# Patient Record
Sex: FEMALE | Race: WHITE | Employment: FULL TIME
[De-identification: names, ages, dates, MRNs, and addresses within clinical notes are randomized per-mention and may not be internally consistent; named-entity substitution may affect disease eponyms.]

---

## 2017-01-11 ENCOUNTER — OFFICE VISIT (OUTPATIENT)
Facility: CLINIC | Age: 47
End: 2017-01-11

## 2017-01-11 VITALS
BODY MASS INDEX: 37.89 KG/M2 | OXYGEN SATURATION: 98 % | DIASTOLIC BLOOD PRESSURE: 84 MMHG | TEMPERATURE: 97.8 F | HEIGHT: 60 IN | WEIGHT: 193 LBS | HEART RATE: 74 BPM | SYSTOLIC BLOOD PRESSURE: 134 MMHG

## 2017-01-11 DIAGNOSIS — I10 ESSENTIAL HYPERTENSION: Primary | ICD-10-CM

## 2017-01-11 PROCEDURE — 99214 OFFICE O/P EST MOD 30 MIN: CPT | Performed by: PHYSICIAN ASSISTANT

## 2017-01-11 RX ORDER — LISINOPRIL 10 MG/1
10 TABLET ORAL DAILY
Qty: 30 TABLET | Refills: 3 | Status: SHIPPED | OUTPATIENT
Start: 2017-01-11 | End: 2017-02-16 | Stop reason: SDUPTHER

## 2017-01-11 ASSESSMENT — ENCOUNTER SYMPTOMS
CHEST TIGHTNESS: 0
DIARRHEA: 0
COUGH: 0
VOMITING: 0
ABDOMINAL PAIN: 0
SHORTNESS OF BREATH: 0
NAUSEA: 0

## 2017-01-18 ENCOUNTER — NURSE ONLY (OUTPATIENT)
Facility: CLINIC | Age: 47
End: 2017-01-18

## 2017-01-18 VITALS
RESPIRATION RATE: 16 BRPM | HEART RATE: 74 BPM | DIASTOLIC BLOOD PRESSURE: 84 MMHG | OXYGEN SATURATION: 98 % | SYSTOLIC BLOOD PRESSURE: 140 MMHG

## 2017-01-18 DIAGNOSIS — I10 ESSENTIAL HYPERTENSION: ICD-10-CM

## 2017-01-18 RX ORDER — AMLODIPINE BESYLATE 10 MG/1
10 TABLET ORAL DAILY
Qty: 90 TABLET | Refills: 1 | Status: SHIPPED | OUTPATIENT
Start: 2017-01-18 | End: 2017-02-16 | Stop reason: DRUGHIGH

## 2017-01-18 RX ORDER — AMLODIPINE BESYLATE 10 MG/1
10 TABLET ORAL DAILY
Qty: 90 TABLET | Refills: 1 | Status: CANCELLED | OUTPATIENT
Start: 2017-01-18

## 2017-02-16 ENCOUNTER — OFFICE VISIT (OUTPATIENT)
Facility: CLINIC | Age: 47
End: 2017-02-16

## 2017-02-16 VITALS
SYSTOLIC BLOOD PRESSURE: 118 MMHG | OXYGEN SATURATION: 99 % | WEIGHT: 199 LBS | HEIGHT: 60 IN | TEMPERATURE: 98 F | HEART RATE: 76 BPM | DIASTOLIC BLOOD PRESSURE: 80 MMHG | BODY MASS INDEX: 39.07 KG/M2

## 2017-02-16 DIAGNOSIS — I10 ESSENTIAL HYPERTENSION: ICD-10-CM

## 2017-02-16 PROCEDURE — 99213 OFFICE O/P EST LOW 20 MIN: CPT | Performed by: PHYSICIAN ASSISTANT

## 2017-02-16 RX ORDER — LISINOPRIL 10 MG/1
10 TABLET ORAL DAILY
Qty: 30 TABLET | Refills: 3 | Status: SHIPPED | OUTPATIENT
Start: 2017-02-16 | End: 2017-09-13 | Stop reason: SDUPTHER

## 2017-02-16 RX ORDER — AMLODIPINE BESYLATE 10 MG/1
10 TABLET ORAL DAILY
Qty: 30 TABLET | Refills: 0 | Status: SHIPPED | OUTPATIENT
Start: 2017-02-16 | End: 2017-09-13 | Stop reason: SDUPTHER

## 2017-02-16 ASSESSMENT — ENCOUNTER SYMPTOMS
SHORTNESS OF BREATH: 0
VOMITING: 0
PHOTOPHOBIA: 0
DIARRHEA: 0
NAUSEA: 0
COUGH: 0
CHEST TIGHTNESS: 0
ABDOMINAL PAIN: 0

## 2017-04-13 ENCOUNTER — HOSPITAL ENCOUNTER (OUTPATIENT)
Dept: WOMENS IMAGING | Age: 47
Discharge: HOME | End: 2017-04-13
Payer: COMMERCIAL

## 2017-04-13 DIAGNOSIS — Z13.9 SCREENING: ICD-10-CM

## 2017-04-13 PROCEDURE — 77063 BREAST TOMOSYNTHESIS BI: CPT

## 2018-08-30 ENCOUNTER — OFFICE VISIT (OUTPATIENT)
Dept: OBGYN CLINIC | Age: 48
End: 2018-08-30
Payer: COMMERCIAL

## 2018-08-30 ENCOUNTER — OFFICE VISIT (OUTPATIENT)
Dept: PODIATRY | Age: 48
End: 2018-08-30
Payer: COMMERCIAL

## 2018-08-30 VITALS
HEART RATE: 83 BPM | BODY MASS INDEX: 39.3 KG/M2 | HEIGHT: 61 IN | SYSTOLIC BLOOD PRESSURE: 165 MMHG | DIASTOLIC BLOOD PRESSURE: 99 MMHG

## 2018-08-30 VITALS
HEART RATE: 74 BPM | BODY MASS INDEX: 40.97 KG/M2 | WEIGHT: 217 LBS | SYSTOLIC BLOOD PRESSURE: 144 MMHG | DIASTOLIC BLOOD PRESSURE: 100 MMHG | HEIGHT: 61 IN

## 2018-08-30 DIAGNOSIS — M72.2 PLANTAR FASCIITIS OF LEFT FOOT: Primary | ICD-10-CM

## 2018-08-30 DIAGNOSIS — Z74.09 PROLONGED IMMOBILIZATION: ICD-10-CM

## 2018-08-30 DIAGNOSIS — Z11.51 SCREENING FOR HUMAN PAPILLOMAVIRUS: ICD-10-CM

## 2018-08-30 DIAGNOSIS — R23.2 HOT FLASHES: ICD-10-CM

## 2018-08-30 DIAGNOSIS — Z01.419 PAP SMEAR, AS PART OF ROUTINE GYNECOLOGICAL EXAMINATION: Primary | ICD-10-CM

## 2018-08-30 DIAGNOSIS — M79.672 PAIN IN LEFT FOOT: ICD-10-CM

## 2018-08-30 PROCEDURE — G8417 CALC BMI ABV UP PARAM F/U: HCPCS | Performed by: PODIATRIST

## 2018-08-30 PROCEDURE — G8427 DOCREV CUR MEDS BY ELIG CLIN: HCPCS | Performed by: PODIATRIST

## 2018-08-30 PROCEDURE — 73630 X-RAY EXAM OF FOOT: CPT | Performed by: PODIATRIST

## 2018-08-30 PROCEDURE — 1036F TOBACCO NON-USER: CPT | Performed by: PODIATRIST

## 2018-08-30 PROCEDURE — 99203 OFFICE O/P NEW LOW 30 MIN: CPT | Performed by: PODIATRIST

## 2018-08-30 PROCEDURE — 99386 PREV VISIT NEW AGE 40-64: CPT | Performed by: NURSE PRACTITIONER

## 2018-08-30 PROCEDURE — L4360 PNEUMAT WALKING BOOT PRE CST: HCPCS | Performed by: PODIATRIST

## 2018-08-30 RX ORDER — ESCITALOPRAM OXALATE 10 MG/1
10 TABLET ORAL DAILY
Qty: 30 TABLET | Refills: 2 | Status: SHIPPED | OUTPATIENT
Start: 2018-08-30 | End: 2018-12-05 | Stop reason: SDUPTHER

## 2018-08-30 ASSESSMENT — PATIENT HEALTH QUESTIONNAIRE - PHQ9
SUM OF ALL RESPONSES TO PHQ9 QUESTIONS 1 & 2: 0
2. FEELING DOWN, DEPRESSED OR HOPELESS: 0
2. FEELING DOWN, DEPRESSED OR HOPELESS: 0
1. LITTLE INTEREST OR PLEASURE IN DOING THINGS: 0
SUM OF ALL RESPONSES TO PHQ9 QUESTIONS 1 & 2: 0
SUM OF ALL RESPONSES TO PHQ QUESTIONS 1-9: 0
SUM OF ALL RESPONSES TO PHQ QUESTIONS 1-9: 0
1. LITTLE INTEREST OR PLEASURE IN DOING THINGS: 0
SUM OF ALL RESPONSES TO PHQ QUESTIONS 1-9: 0
SUM OF ALL RESPONSES TO PHQ QUESTIONS 1-9: 0

## 2018-08-30 ASSESSMENT — ENCOUNTER SYMPTOMS
NAUSEA: 0
SHORTNESS OF BREATH: 0
COLOR CHANGE: 0
DIARRHEA: 0
BACK PAIN: 0

## 2018-08-30 NOTE — PROGRESS NOTES
palpable. PT pulse of the right foot is  palpable. PT pulse of the left foot is  palpable. CFT is less than 3 secs to the digits of the right foot. CFT is less than 3 secs to the digits of the left foot. There is edema noted to the left foot. There is hair growth noted to the digits of the bilateral feet. There are no varicosities noted to the right foot/ankle. There are no varicosities noted to the left foot/ankle. Erythema is absent to the bilateral feet. Neurological: Reflexes are present to the right plantar foot and to the Achilles tendon. Reflexes are present to the left plantar foot and to the Achilles tendon. Epicritic sensation is  intact to the right foot. Epicritic sensation is  intact to the left foot. Musculoskeletal:  Muscle strength is +5/5 to all four muscle groups of the right lower extremity and +5/5 to all four muscle groups of the left lower extremity. There are no areas of subluxation, dislocation, or laxity noted to either lower extremity. Range of motion to the right ankle is  free of pain or grinding. Range of motion to the left ankle is  free of pain or grinding. Range of motion to the right subtalar joint is  free of pain or grinding. Range of motion to the left subtalar joint is  free of pain or grinding. No abnormalities, asymmetries, or misalignments are seen between the extremities. Weightbearing evaluation does not reveal rearfoot eversion, medial prominence of the talar head, loss of the medial longitudinal arch height, and too many toes sign bilaterally. The digits of the right foot are not contracted. The digits of the left foot are not contracted. There is no prominence noted to the first metatarsal head without abduction of the hallux of the right foot. There is no prominence noted to the first metatarsal head without abduction of the hallux of the left foot.     There is pain with palpation to the plantar medial calcaneal tubercle of the left foot. There is pain with palpation to the plantar central aspect of the left heel. There is pain with palpation to the deepika pedis of the left foot. There is pain with medial to lateral compression of the left calcaneus. Tinel's sign is negative to the left tarsal tunnel. There is no erythema, calor, or open lesion noted to the left foot or ankle. Shoe examination was performed. Biomechanical Exam: abnormal left as the patient limps. X-ray's taken: Lateral, Lateral Oblique, and Medial Oblique of the left foot. Findings: There is no evidence of fracture or stress fracture noted. Asessment: Patient is a 49 y.o. female with:    Diagnosis Orders   1. Plantar fasciitis of left foot  XR FOOT LEFT (MIN 3 VIEWS)    TN PNEUMAT WALKING BOOT PRE CST    DISCONTINUED: rivaroxaban (XARELTO) 10 MG TABS tablet   2. Pain in left foot  XR FOOT LEFT (MIN 3 VIEWS)    TN PNEUMAT WALKING BOOT PRE CST    DISCONTINUED: rivaroxaban (XARELTO) 10 MG TABS tablet   3. Prolonged immobilization  TN PNEUMAT WALKING BOOT PRE CST    DISCONTINUED: rivaroxaban (XARELTO) 10 MG TABS tablet       Plan:  1. Clinical evaluation of the patient. 2. I have dispensed a pneumatic equalizer walker to the patient's left lower extremity. Due to the patient's diagnosis and related symptoms this is medically necessary for the treatment. The function of this device is to restrict and limit motion and provide stabilization and compression to the affected area. Specific instructions on weight bearing and ROM exercises were discussed and given to the patient. The goals and function of this device were explained in detail to the patient. Upon dispensing, the device appeared to be fitting well and the patient states that the device is comfortable at this time. The patient was shown how to properly apply, wear, and care for the device.  The patient was able to properly apply the device and ambulate with it without distress. At the time that the device was dispensed it was suitable for the patient's condition and was not substandard. No guarantees were given or implied and the precautions of the device were reviewed the patient. Written instructions and warranty information was given along with the list of the twenty-one (21) Durable Medical Equipment Supplier Guidelines. All patient questions were answered satisfactorily. Patient instructed on proper icing and rest of the left foot. Patient given a prescription for Xarelto for DVT prophylaxis. 3. Contact office with any questions/problems/concerns. Return in about 2 weeks (around 9/13/2018) for Evaluation of plantar fasciitis and stress fracture left calcaneus.    8/30/2018      Radha Morin DPM

## 2018-08-30 NOTE — PROGRESS NOTES
High Cholesterol Father     High Blood Pressure Father     Stroke Father     Diabetes Sister     High Blood Pressure Sister     High Blood Pressure Brother     Depression Maternal Grandmother     Cancer Maternal Grandfather         leukemia    Heart Disease Maternal Grandfather         MI    High Blood Pressure Sister     Mental Illness Brother         anxiety     Social History     Social History    Marital status: Single     Spouse name: N/A    Number of children: N/A    Years of education: N/A     Occupational History    Not on file. Social History Main Topics    Smoking status: Never Smoker    Smokeless tobacco: Never Used    Alcohol use No    Drug use: No    Sexual activity: Not on file     Other Topics Concern    Not on file     Social History Narrative    No narrative on file       MEDICATIONS:  Current Outpatient Prescriptions   Medication Sig Dispense Refill    amLODIPine (NORVASC) 10 MG tablet Take 1 tablet by mouth daily 30 tablet 3    lisinopril (PRINIVIL;ZESTRIL) 10 MG tablet Take 1 tablet by mouth daily 30 tablet 3    escitalopram (LEXAPRO) 10 MG tablet Take 1 tablet by mouth daily 30 tablet 2     No current facility-administered medications for this visit. ALLERGIES:  Allergies as of 08/30/2018    (No Known Allergies)       Symptoms of decreased mood absent  Symptoms of anhedonia absent    **If either question is answered in a  positive fashion then complete the PHQ9 Scoring Evaluation and make the appropriate referral**      Immunization status: stated as current, but no records available. Gynecologic History:  Menarche: 14 yo  Menopause at 55 yo     Patient's last menstrual period was 09/06/2016. Sexually Active: No    STD History: No     Permanent Sterilization: Yes hyst   Reversible Birth Control: No        Hormone Replacement Exposure:  Yes  X 1 year      Genetic Qualified Family History of Breast, Ovarian , Colon or Uterine Cancer: No     If YES see found and no family     History. 3. Bone density studies every 2-3 years. Begin at 70 yo. If no fracture history or osteoporosis family history. (significant). 4. Colonoscopy begin at 38 yo. Repeat every ten years if negative and no family history. 5. Calcium of 5375-2449 mg/day in split dosing  6. Vitamin D 400-800 IU/day  7. All other preventative health recommendations will be managed by the patients Primary care physician. PLAN:  Return in about 1 year (around 8/30/2019) for annual.   Prescription for Lexapro sent to pharmacy-hot flashes. Repeat Annual every 1 year  Cervical Cytology Evaluation begins at 24years old. If Negative Cytology, Follow-up screening per current guidelines. Mammograms every 1 year. If 37 yo and last mammogram was negative. Calcium and Vitamin D dosing reviewed. Colonoscopy screening reviewed as well as onset for bone density testing. Birth control and barrier recommendations discussed. STD counseling and prevention reviewed. Gardisil counseling completed for all patients 7-35 yo. Routine health maintenance per patients PCP. No orders of the defined types were placed in this encounter.

## 2018-09-13 ENCOUNTER — HOSPITAL ENCOUNTER (OUTPATIENT)
Age: 48
Setting detail: SPECIMEN
Discharge: HOME | End: 2018-09-13
Payer: COMMERCIAL

## 2018-09-13 ENCOUNTER — HOSPITAL ENCOUNTER (OUTPATIENT)
Dept: WOMENS IMAGING | Age: 48
Discharge: HOME | End: 2018-09-15
Payer: COMMERCIAL

## 2018-09-13 DIAGNOSIS — E78.5 HYPERLIPIDEMIA, UNSPECIFIED HYPERLIPIDEMIA TYPE: ICD-10-CM

## 2018-09-13 DIAGNOSIS — E66.01 CLASS 3 SEVERE OBESITY DUE TO EXCESS CALORIES WITH SERIOUS COMORBIDITY AND BODY MASS INDEX (BMI) OF 40.0 TO 44.9 IN ADULT (HCC): ICD-10-CM

## 2018-09-13 DIAGNOSIS — Z12.39 ENCOUNTER FOR BREAST CANCER SCREENING OTHER THAN MAMMOGRAM: ICD-10-CM

## 2018-09-13 DIAGNOSIS — I10 ESSENTIAL HYPERTENSION: ICD-10-CM

## 2018-09-13 LAB
ALBUMIN SERPL-MCNC: 3.9 G/DL (ref 3.5–5.2)
ALBUMIN/GLOBULIN RATIO: 1.1 (ref 1–2.5)
ALP BLD-CCNC: 106 U/L (ref 35–104)
ALT SERPL-CCNC: 13 U/L (ref 5–33)
ANION GAP SERPL CALCULATED.3IONS-SCNC: 14 MMOL/L (ref 9–17)
AST SERPL-CCNC: 14 U/L
BILIRUB SERPL-MCNC: 0.41 MG/DL (ref 0.3–1.2)
BUN BLDV-MCNC: 17 MG/DL (ref 6–20)
BUN/CREAT BLD: ABNORMAL (ref 9–20)
CALCIUM SERPL-MCNC: 9.3 MG/DL (ref 8.6–10.4)
CHLORIDE BLD-SCNC: 102 MMOL/L (ref 98–107)
CHOLESTEROL/HDL RATIO: 3.6
CHOLESTEROL: 192 MG/DL
CO2: 24 MMOL/L (ref 20–31)
CREAT SERPL-MCNC: 0.62 MG/DL (ref 0.5–0.9)
GFR AFRICAN AMERICAN: >60 ML/MIN
GFR NON-AFRICAN AMERICAN: >60 ML/MIN
GFR SERPL CREATININE-BSD FRML MDRD: ABNORMAL ML/MIN/{1.73_M2}
GFR SERPL CREATININE-BSD FRML MDRD: ABNORMAL ML/MIN/{1.73_M2}
GLUCOSE BLD-MCNC: 87 MG/DL (ref 70–99)
HCT VFR BLD CALC: 44.4 % (ref 36.3–47.1)
HDLC SERPL-MCNC: 53 MG/DL
HEMOGLOBIN: 13.5 G/DL (ref 11.9–15.1)
LDL CHOLESTEROL: 125 MG/DL (ref 0–130)
MCH RBC QN AUTO: 25.5 PG (ref 25.2–33.5)
MCHC RBC AUTO-ENTMCNC: 30.4 G/DL (ref 28.4–34.8)
MCV RBC AUTO: 83.8 FL (ref 82.6–102.9)
NRBC AUTOMATED: 0 PER 100 WBC
PDW BLD-RTO: 15.9 % (ref 11.8–14.4)
PLATELET # BLD: 359 K/UL (ref 138–453)
PMV BLD AUTO: 10.3 FL (ref 8.1–13.5)
POTASSIUM SERPL-SCNC: 4.9 MMOL/L (ref 3.7–5.3)
RBC # BLD: 5.3 M/UL (ref 3.95–5.11)
SODIUM BLD-SCNC: 140 MMOL/L (ref 135–144)
TOTAL PROTEIN: 7.4 G/DL (ref 6.4–8.3)
TRIGL SERPL-MCNC: 69 MG/DL
VLDLC SERPL CALC-MCNC: NORMAL MG/DL (ref 1–30)
WBC # BLD: 8.6 K/UL (ref 3.5–11.3)

## 2018-09-13 PROCEDURE — 77063 BREAST TOMOSYNTHESIS BI: CPT

## 2018-09-27 ENCOUNTER — OFFICE VISIT (OUTPATIENT)
Dept: PODIATRY | Age: 48
End: 2018-09-27
Payer: COMMERCIAL

## 2018-09-27 VITALS
BODY MASS INDEX: 40.97 KG/M2 | WEIGHT: 217 LBS | HEIGHT: 61 IN | DIASTOLIC BLOOD PRESSURE: 76 MMHG | HEART RATE: 67 BPM | SYSTOLIC BLOOD PRESSURE: 130 MMHG

## 2018-09-27 DIAGNOSIS — M72.2 PLANTAR FASCIITIS OF LEFT FOOT: Primary | ICD-10-CM

## 2018-09-27 DIAGNOSIS — M79.672 PAIN IN LEFT FOOT: ICD-10-CM

## 2018-09-27 PROCEDURE — 99213 OFFICE O/P EST LOW 20 MIN: CPT | Performed by: PODIATRIST

## 2018-09-27 PROCEDURE — G8427 DOCREV CUR MEDS BY ELIG CLIN: HCPCS | Performed by: PODIATRIST

## 2018-09-27 PROCEDURE — 1036F TOBACCO NON-USER: CPT | Performed by: PODIATRIST

## 2018-09-27 PROCEDURE — G8417 CALC BMI ABV UP PARAM F/U: HCPCS | Performed by: PODIATRIST

## 2018-09-27 PROCEDURE — 20550 NJX 1 TENDON SHEATH/LIGAMENT: CPT | Performed by: PODIATRIST

## 2018-09-27 RX ORDER — ASPIRIN 325 MG
325 TABLET ORAL DAILY
COMMUNITY
End: 2019-02-01 | Stop reason: CLARIF

## 2018-09-27 RX ORDER — BUPIVACAINE HYDROCHLORIDE 2.5 MG/ML
5 INJECTION, SOLUTION INFILTRATION; PERINEURAL ONCE
Status: COMPLETED | OUTPATIENT
Start: 2018-09-27 | End: 2018-09-27

## 2018-09-27 RX ADMIN — BUPIVACAINE HYDROCHLORIDE 12.5 MG: 2.5 INJECTION, SOLUTION INFILTRATION; PERINEURAL at 11:25

## 2018-09-27 ASSESSMENT — ENCOUNTER SYMPTOMS
NAUSEA: 0
COLOR CHANGE: 0
SHORTNESS OF BREATH: 0
BACK PAIN: 0
DIARRHEA: 0

## 2018-10-11 ENCOUNTER — OFFICE VISIT (OUTPATIENT)
Dept: PODIATRY | Age: 48
End: 2018-10-11
Payer: COMMERCIAL

## 2018-10-11 VITALS
WEIGHT: 217 LBS | DIASTOLIC BLOOD PRESSURE: 60 MMHG | SYSTOLIC BLOOD PRESSURE: 127 MMHG | BODY MASS INDEX: 40.97 KG/M2 | HEIGHT: 61 IN | HEART RATE: 70 BPM

## 2018-10-11 DIAGNOSIS — M72.2 PLANTAR FASCIITIS OF LEFT FOOT: Primary | ICD-10-CM

## 2018-10-11 DIAGNOSIS — M79.672 PAIN IN LEFT FOOT: ICD-10-CM

## 2018-10-11 PROCEDURE — 99213 OFFICE O/P EST LOW 20 MIN: CPT | Performed by: PODIATRIST

## 2018-10-11 PROCEDURE — 1036F TOBACCO NON-USER: CPT | Performed by: PODIATRIST

## 2018-10-11 PROCEDURE — G8484 FLU IMMUNIZE NO ADMIN: HCPCS | Performed by: PODIATRIST

## 2018-10-11 PROCEDURE — G8417 CALC BMI ABV UP PARAM F/U: HCPCS | Performed by: PODIATRIST

## 2018-10-11 PROCEDURE — G8427 DOCREV CUR MEDS BY ELIG CLIN: HCPCS | Performed by: PODIATRIST

## 2018-10-11 ASSESSMENT — ENCOUNTER SYMPTOMS
NAUSEA: 0
DIARRHEA: 0
COLOR CHANGE: 0
SHORTNESS OF BREATH: 0
BACK PAIN: 0

## 2018-11-02 ENCOUNTER — OFFICE VISIT (OUTPATIENT)
Dept: PODIATRY | Age: 48
End: 2018-11-02
Payer: COMMERCIAL

## 2018-11-02 VITALS — WEIGHT: 217 LBS | BODY MASS INDEX: 40.97 KG/M2 | HEIGHT: 61 IN

## 2018-11-02 DIAGNOSIS — M79.672 PAIN IN LEFT FOOT: ICD-10-CM

## 2018-11-02 DIAGNOSIS — M72.2 PLANTAR FASCIITIS OF LEFT FOOT: Primary | ICD-10-CM

## 2018-11-02 PROCEDURE — G8427 DOCREV CUR MEDS BY ELIG CLIN: HCPCS | Performed by: PODIATRIST

## 2018-11-02 PROCEDURE — G8484 FLU IMMUNIZE NO ADMIN: HCPCS | Performed by: PODIATRIST

## 2018-11-02 PROCEDURE — G8417 CALC BMI ABV UP PARAM F/U: HCPCS | Performed by: PODIATRIST

## 2018-11-02 PROCEDURE — 1036F TOBACCO NON-USER: CPT | Performed by: PODIATRIST

## 2018-11-02 PROCEDURE — 99213 OFFICE O/P EST LOW 20 MIN: CPT | Performed by: PODIATRIST

## 2018-11-02 PROCEDURE — 20550 NJX 1 TENDON SHEATH/LIGAMENT: CPT | Performed by: PODIATRIST

## 2018-11-02 RX ORDER — BUPIVACAINE HYDROCHLORIDE 2.5 MG/ML
5 INJECTION, SOLUTION INFILTRATION; PERINEURAL ONCE
Status: COMPLETED | OUTPATIENT
Start: 2018-11-02 | End: 2018-11-02

## 2018-11-02 RX ADMIN — BUPIVACAINE HYDROCHLORIDE 12.5 MG: 2.5 INJECTION, SOLUTION INFILTRATION; PERINEURAL at 11:43

## 2018-11-02 ASSESSMENT — ENCOUNTER SYMPTOMS
COLOR CHANGE: 0
NAUSEA: 0
SHORTNESS OF BREATH: 0
DIARRHEA: 0
BACK PAIN: 0

## 2018-11-20 ENCOUNTER — OFFICE VISIT (OUTPATIENT)
Dept: PODIATRY | Age: 48
End: 2018-11-20
Payer: COMMERCIAL

## 2018-11-20 VITALS — BODY MASS INDEX: 40.97 KG/M2 | HEIGHT: 61 IN | WEIGHT: 217 LBS

## 2018-11-20 DIAGNOSIS — M72.2 PLANTAR FASCIITIS OF LEFT FOOT: Primary | ICD-10-CM

## 2018-11-20 DIAGNOSIS — M79.672 PAIN IN LEFT FOOT: ICD-10-CM

## 2018-11-20 PROCEDURE — G8484 FLU IMMUNIZE NO ADMIN: HCPCS | Performed by: PODIATRIST

## 2018-11-20 PROCEDURE — G8427 DOCREV CUR MEDS BY ELIG CLIN: HCPCS | Performed by: PODIATRIST

## 2018-11-20 PROCEDURE — 99213 OFFICE O/P EST LOW 20 MIN: CPT | Performed by: PODIATRIST

## 2018-11-20 PROCEDURE — G8417 CALC BMI ABV UP PARAM F/U: HCPCS | Performed by: PODIATRIST

## 2018-11-20 PROCEDURE — 1036F TOBACCO NON-USER: CPT | Performed by: PODIATRIST

## 2018-11-20 ASSESSMENT — ENCOUNTER SYMPTOMS
BACK PAIN: 0
DIARRHEA: 0
COLOR CHANGE: 0
NAUSEA: 0
SHORTNESS OF BREATH: 0

## 2018-12-05 ENCOUNTER — TELEPHONE (OUTPATIENT)
Dept: OBGYN CLINIC | Age: 48
End: 2018-12-05

## 2018-12-05 DIAGNOSIS — R23.2 HOT FLASHES: ICD-10-CM

## 2018-12-05 RX ORDER — ESCITALOPRAM OXALATE 10 MG/1
10 TABLET ORAL DAILY
Qty: 30 TABLET | Refills: 6 | Status: SHIPPED | OUTPATIENT
Start: 2018-12-05 | End: 2019-04-10

## 2019-02-01 PROBLEM — R23.2 HOT FLASHES: Status: ACTIVE | Noted: 2019-02-01

## 2019-03-01 ENCOUNTER — TELEPHONE (OUTPATIENT)
Dept: OBGYN CLINIC | Age: 49
End: 2019-03-01

## 2019-03-01 RX ORDER — PAROXETINE 10 MG/1
10 TABLET, FILM COATED ORAL EVERY MORNING
Qty: 30 TABLET | Refills: 3 | Status: SHIPPED | OUTPATIENT
Start: 2019-03-01 | End: 2019-04-10 | Stop reason: CLARIF

## 2019-04-10 ENCOUNTER — TELEPHONE (OUTPATIENT)
Dept: OBGYN CLINIC | Age: 49
End: 2019-04-10

## 2019-04-10 DIAGNOSIS — R23.2 HOT FLASHES: ICD-10-CM

## 2019-04-10 NOTE — TELEPHONE ENCOUNTER
Requesting to go off the medication for hot flashes and go back on Lisinopril she is having mood swings and anger issues. Please call patient

## 2019-04-10 NOTE — TELEPHONE ENCOUNTER
Patient is currently taking Paxil for her hot flashes but she states it is making her crazy. She was originally on Lexapro but had some issues with her insurance, she is requesting to stop the paxil and retry the lexapro. Can we send in script to [address]?

## 2019-04-11 RX ORDER — ESCITALOPRAM OXALATE 10 MG/1
10 TABLET ORAL DAILY
Qty: 30 TABLET | Refills: 6 | Status: SHIPPED | OUTPATIENT
Start: 2019-04-11 | End: 2019-07-10 | Stop reason: CLARIF

## 2019-07-19 ENCOUNTER — HOSPITAL ENCOUNTER (OUTPATIENT)
Age: 49
Setting detail: SPECIMEN
Discharge: HOME | End: 2019-07-19
Payer: COMMERCIAL

## 2019-07-19 DIAGNOSIS — E78.5 HYPERLIPIDEMIA, UNSPECIFIED HYPERLIPIDEMIA TYPE: ICD-10-CM

## 2019-07-19 DIAGNOSIS — I10 ESSENTIAL HYPERTENSION: ICD-10-CM

## 2019-07-19 DIAGNOSIS — Z11.4 ENCOUNTER FOR SCREENING FOR HIV: ICD-10-CM

## 2019-07-19 DIAGNOSIS — E55.9 VITAMIN D INSUFFICIENCY: ICD-10-CM

## 2019-07-19 DIAGNOSIS — F41.9 ANXIETY: ICD-10-CM

## 2019-07-19 LAB
ABSOLUTE EOS #: 0.25 K/UL (ref 0–0.44)
ABSOLUTE IMMATURE GRANULOCYTE: 0.04 K/UL (ref 0–0.3)
ABSOLUTE LYMPH #: 2.34 K/UL (ref 1.1–3.7)
ABSOLUTE MONO #: 0.51 K/UL (ref 0.1–1.2)
ALBUMIN SERPL-MCNC: 4.3 G/DL (ref 3.5–5.2)
ALBUMIN/GLOBULIN RATIO: 1.1 (ref 1–2.5)
ALP BLD-CCNC: 106 U/L (ref 35–104)
ALT SERPL-CCNC: 22 U/L (ref 5–33)
ANION GAP SERPL CALCULATED.3IONS-SCNC: 23 MMOL/L (ref 9–17)
AST SERPL-CCNC: 25 U/L
BASOPHILS # BLD: 1 % (ref 0–2)
BASOPHILS ABSOLUTE: 0.07 K/UL (ref 0–0.2)
BILIRUB SERPL-MCNC: 0.43 MG/DL (ref 0.3–1.2)
BUN BLDV-MCNC: 22 MG/DL (ref 6–20)
BUN/CREAT BLD: ABNORMAL (ref 9–20)
CALCIUM SERPL-MCNC: 9.8 MG/DL (ref 8.6–10.4)
CHLORIDE BLD-SCNC: 103 MMOL/L (ref 98–107)
CHOLESTEROL/HDL RATIO: 3.9
CHOLESTEROL: 209 MG/DL
CO2: 17 MMOL/L (ref 20–31)
CREAT SERPL-MCNC: 0.61 MG/DL (ref 0.5–0.9)
DIFFERENTIAL TYPE: ABNORMAL
EOSINOPHILS RELATIVE PERCENT: 3 % (ref 1–4)
GFR AFRICAN AMERICAN: >60 ML/MIN
GFR NON-AFRICAN AMERICAN: >60 ML/MIN
GFR SERPL CREATININE-BSD FRML MDRD: ABNORMAL ML/MIN/{1.73_M2}
GFR SERPL CREATININE-BSD FRML MDRD: ABNORMAL ML/MIN/{1.73_M2}
GLUCOSE BLD-MCNC: 88 MG/DL (ref 70–99)
HCT VFR BLD CALC: 47.9 % (ref 36.3–47.1)
HDLC SERPL-MCNC: 54 MG/DL
HEMOGLOBIN: 14.1 G/DL (ref 11.9–15.1)
IMMATURE GRANULOCYTES: 1 %
LDL CHOLESTEROL: 137 MG/DL (ref 0–130)
LYMPHOCYTES # BLD: 29 % (ref 24–43)
MCH RBC QN AUTO: 24.5 PG (ref 25.2–33.5)
MCHC RBC AUTO-ENTMCNC: 29.4 G/DL (ref 28.4–34.8)
MCV RBC AUTO: 83.3 FL (ref 82.6–102.9)
MONOCYTES # BLD: 6 % (ref 3–12)
NRBC AUTOMATED: 0 PER 100 WBC
PDW BLD-RTO: 18.2 % (ref 11.8–14.4)
PLATELET # BLD: 359 K/UL (ref 138–453)
PLATELET ESTIMATE: ABNORMAL
PMV BLD AUTO: 10.6 FL (ref 8.1–13.5)
POTASSIUM SERPL-SCNC: 4.8 MMOL/L (ref 3.7–5.3)
RBC # BLD: 5.75 M/UL (ref 3.95–5.11)
RBC # BLD: ABNORMAL 10*6/UL
SEG NEUTROPHILS: 60 % (ref 36–65)
SEGMENTED NEUTROPHILS ABSOLUTE COUNT: 4.91 K/UL (ref 1.5–8.1)
SODIUM BLD-SCNC: 143 MMOL/L (ref 135–144)
THYROXINE, FREE: 1.34 NG/DL (ref 0.93–1.7)
TOTAL PROTEIN: 8.2 G/DL (ref 6.4–8.3)
TRIGL SERPL-MCNC: 91 MG/DL
TSH SERPL DL<=0.05 MIU/L-ACNC: 1.22 MIU/L (ref 0.3–5)
VITAMIN D 25-HYDROXY: 13.7 NG/ML (ref 30–100)
VLDLC SERPL CALC-MCNC: ABNORMAL MG/DL (ref 1–30)
WBC # BLD: 8.1 K/UL (ref 3.5–11.3)
WBC # BLD: ABNORMAL 10*3/UL

## 2019-07-21 LAB — HIV AG/AB: NONREACTIVE

## 2019-10-24 ENCOUNTER — TELEPHONE (OUTPATIENT)
Dept: GASTROENTEROLOGY | Age: 49
End: 2019-10-24

## 2019-10-25 DIAGNOSIS — Z12.11 SCREENING FOR COLON CANCER: Primary | ICD-10-CM

## 2019-10-25 DIAGNOSIS — R19.5 POSITIVE COLORECTAL CANCER SCREENING USING COLOGUARD TEST: ICD-10-CM

## 2019-10-25 RX ORDER — POLYETHYLENE GLYCOL 3350 17 G/17G
POWDER, FOR SOLUTION ORAL
Qty: 255 G | Refills: 0 | Status: SHIPPED | OUTPATIENT
Start: 2019-10-25 | End: 2019-11-24

## 2019-11-05 ENCOUNTER — TELEPHONE (OUTPATIENT)
Dept: GASTROENTEROLOGY | Age: 49
End: 2019-11-05

## 2019-11-11 ENCOUNTER — ANESTHESIA EVENT (OUTPATIENT)
Dept: ENDOSCOPY | Age: 49
End: 2019-11-11
Payer: COMMERCIAL

## 2019-11-11 ENCOUNTER — HOSPITAL ENCOUNTER (OUTPATIENT)
Age: 49
Setting detail: OUTPATIENT SURGERY
Discharge: HOME | End: 2019-11-11
Attending: INTERNAL MEDICINE | Admitting: INTERNAL MEDICINE
Payer: COMMERCIAL

## 2019-11-11 ENCOUNTER — ANESTHESIA (OUTPATIENT)
Dept: ENDOSCOPY | Age: 49
End: 2019-11-11
Payer: COMMERCIAL

## 2019-11-11 VITALS
DIASTOLIC BLOOD PRESSURE: 90 MMHG | OXYGEN SATURATION: 99 % | HEIGHT: 61 IN | RESPIRATION RATE: 20 BRPM | BODY MASS INDEX: 43.05 KG/M2 | WEIGHT: 228 LBS | TEMPERATURE: 98.2 F | SYSTOLIC BLOOD PRESSURE: 157 MMHG | HEART RATE: 100 BPM

## 2019-11-11 VITALS — DIASTOLIC BLOOD PRESSURE: 86 MMHG | OXYGEN SATURATION: 100 % | SYSTOLIC BLOOD PRESSURE: 125 MMHG

## 2019-11-11 PROCEDURE — 2580000003 HC RX 258: Performed by: ANESTHESIOLOGY

## 2019-11-11 PROCEDURE — 6360000002 HC RX W HCPCS: Performed by: ANESTHESIOLOGY

## 2019-11-11 PROCEDURE — 7100000001 HC PACU RECOVERY - ADDTL 15 MIN: Performed by: INTERNAL MEDICINE

## 2019-11-11 PROCEDURE — 3700000000 HC ANESTHESIA ATTENDED CARE: Performed by: INTERNAL MEDICINE

## 2019-11-11 PROCEDURE — 7100000000 HC PACU RECOVERY - FIRST 15 MIN: Performed by: INTERNAL MEDICINE

## 2019-11-11 PROCEDURE — 45378 DIAGNOSTIC COLONOSCOPY: CPT | Performed by: INTERNAL MEDICINE

## 2019-11-11 PROCEDURE — 2500000003 HC RX 250 WO HCPCS: Performed by: NURSE ANESTHETIST, CERTIFIED REGISTERED

## 2019-11-11 PROCEDURE — 3609027000 HC COLONOSCOPY: Performed by: INTERNAL MEDICINE

## 2019-11-11 PROCEDURE — 3700000001 HC ADD 15 MINUTES (ANESTHESIA): Performed by: INTERNAL MEDICINE

## 2019-11-11 PROCEDURE — 2709999900 HC NON-CHARGEABLE SUPPLY: Performed by: INTERNAL MEDICINE

## 2019-11-11 PROCEDURE — 6360000002 HC RX W HCPCS: Performed by: NURSE ANESTHETIST, CERTIFIED REGISTERED

## 2019-11-11 RX ORDER — LIDOCAINE HYDROCHLORIDE 10 MG/ML
INJECTION, SOLUTION INFILTRATION; PERINEURAL PRN
Status: DISCONTINUED | OUTPATIENT
Start: 2019-11-11 | End: 2019-11-11 | Stop reason: SDUPTHER

## 2019-11-11 RX ORDER — SODIUM CHLORIDE, SODIUM LACTATE, POTASSIUM CHLORIDE, CALCIUM CHLORIDE 600; 310; 30; 20 MG/100ML; MG/100ML; MG/100ML; MG/100ML
INJECTION, SOLUTION INTRAVENOUS CONTINUOUS
Status: DISCONTINUED | OUTPATIENT
Start: 2019-11-11 | End: 2019-11-11 | Stop reason: HOSPADM

## 2019-11-11 RX ORDER — CHLORZOXAZONE 500 MG/1
500 TABLET ORAL 3 TIMES DAILY PRN
COMMUNITY
End: 2019-12-26 | Stop reason: SDUPTHER

## 2019-11-11 RX ORDER — HYDRALAZINE HYDROCHLORIDE 20 MG/ML
10 INJECTION INTRAMUSCULAR; INTRAVENOUS ONCE
Status: COMPLETED | OUTPATIENT
Start: 2019-11-11 | End: 2019-11-11

## 2019-11-11 RX ORDER — PROPOFOL 10 MG/ML
INJECTION, EMULSION INTRAVENOUS PRN
Status: DISCONTINUED | OUTPATIENT
Start: 2019-11-11 | End: 2019-11-11 | Stop reason: SDUPTHER

## 2019-11-11 RX ADMIN — HYDRALAZINE HYDROCHLORIDE 10 MG: 20 INJECTION INTRAMUSCULAR; INTRAVENOUS at 11:01

## 2019-11-11 RX ADMIN — PROPOFOL 100 MG: 10 INJECTION, EMULSION INTRAVENOUS at 11:44

## 2019-11-11 RX ADMIN — PROPOFOL 100 MG: 10 INJECTION, EMULSION INTRAVENOUS at 11:53

## 2019-11-11 RX ADMIN — PROPOFOL 100 MG: 10 INJECTION, EMULSION INTRAVENOUS at 11:59

## 2019-11-11 RX ADMIN — LIDOCAINE HYDROCHLORIDE 30 MG: 10 INJECTION, SOLUTION INFILTRATION; PERINEURAL at 11:45

## 2019-11-11 RX ADMIN — SODIUM CHLORIDE, POTASSIUM CHLORIDE, SODIUM LACTATE AND CALCIUM CHLORIDE: 600; 310; 30; 20 INJECTION, SOLUTION INTRAVENOUS at 10:51

## 2019-11-11 ASSESSMENT — PULMONARY FUNCTION TESTS
PIF_VALUE: 0

## 2019-11-11 ASSESSMENT — PAIN SCALES - GENERAL: PAINLEVEL_OUTOF10: 0

## 2019-11-11 ASSESSMENT — PAIN - FUNCTIONAL ASSESSMENT: PAIN_FUNCTIONAL_ASSESSMENT: 0-10

## 2019-11-27 ENCOUNTER — TELEPHONE (OUTPATIENT)
Dept: GASTROENTEROLOGY | Age: 49
End: 2019-11-27

## 2020-02-14 ENCOUNTER — HOSPITAL ENCOUNTER (OUTPATIENT)
Age: 50
Setting detail: SPECIMEN
Discharge: HOME | End: 2020-02-14
Payer: COMMERCIAL

## 2020-02-14 LAB
ABSOLUTE EOS #: 0.31 K/UL (ref 0–0.44)
ABSOLUTE IMMATURE GRANULOCYTE: 0.03 K/UL (ref 0–0.3)
ABSOLUTE LYMPH #: 2.52 K/UL (ref 1.1–3.7)
ABSOLUTE MONO #: 0.5 K/UL (ref 0.1–1.2)
ALBUMIN SERPL-MCNC: 4.1 G/DL (ref 3.5–5.2)
ALBUMIN/GLOBULIN RATIO: 1.3 (ref 1–2.5)
ALP BLD-CCNC: 98 U/L (ref 35–104)
ALT SERPL-CCNC: 58 U/L (ref 5–33)
ANION GAP SERPL CALCULATED.3IONS-SCNC: 14 MMOL/L (ref 9–17)
AST SERPL-CCNC: 36 U/L
BASOPHILS # BLD: 1 % (ref 0–2)
BASOPHILS ABSOLUTE: 0.07 K/UL (ref 0–0.2)
BILIRUB SERPL-MCNC: 0.46 MG/DL (ref 0.3–1.2)
BUN BLDV-MCNC: 18 MG/DL (ref 6–20)
BUN/CREAT BLD: ABNORMAL (ref 9–20)
CALCIUM SERPL-MCNC: 9.4 MG/DL (ref 8.6–10.4)
CHLORIDE BLD-SCNC: 104 MMOL/L (ref 98–107)
CHOLESTEROL/HDL RATIO: 3.7
CHOLESTEROL: 181 MG/DL
CO2: 23 MMOL/L (ref 20–31)
CREAT SERPL-MCNC: 0.64 MG/DL (ref 0.5–0.9)
DIFFERENTIAL TYPE: ABNORMAL
EOSINOPHILS RELATIVE PERCENT: 3 % (ref 1–4)
GFR AFRICAN AMERICAN: >60 ML/MIN
GFR NON-AFRICAN AMERICAN: >60 ML/MIN
GFR SERPL CREATININE-BSD FRML MDRD: ABNORMAL ML/MIN/{1.73_M2}
GFR SERPL CREATININE-BSD FRML MDRD: ABNORMAL ML/MIN/{1.73_M2}
GLUCOSE BLD-MCNC: 90 MG/DL (ref 70–99)
HCT VFR BLD CALC: 48 % (ref 36.3–47.1)
HDLC SERPL-MCNC: 49 MG/DL
HEMOGLOBIN: 14 G/DL (ref 11.9–15.1)
IMMATURE GRANULOCYTES: 0 %
LDL CHOLESTEROL: 117 MG/DL (ref 0–130)
LYMPHOCYTES # BLD: 27 % (ref 24–43)
MCH RBC QN AUTO: 23.8 PG (ref 25.2–33.5)
MCHC RBC AUTO-ENTMCNC: 29.2 G/DL (ref 28.4–34.8)
MCV RBC AUTO: 81.5 FL (ref 82.6–102.9)
MONOCYTES # BLD: 5 % (ref 3–12)
NRBC AUTOMATED: 0 PER 100 WBC
PDW BLD-RTO: 18 % (ref 11.8–14.4)
PLATELET # BLD: 368 K/UL (ref 138–453)
PLATELET ESTIMATE: ABNORMAL
PMV BLD AUTO: 10.6 FL (ref 8.1–13.5)
POTASSIUM SERPL-SCNC: 4.7 MMOL/L (ref 3.7–5.3)
RBC # BLD: 5.89 M/UL (ref 3.95–5.11)
RBC # BLD: ABNORMAL 10*6/UL
SEG NEUTROPHILS: 64 % (ref 36–65)
SEGMENTED NEUTROPHILS ABSOLUTE COUNT: 5.84 K/UL (ref 1.5–8.1)
SODIUM BLD-SCNC: 141 MMOL/L (ref 135–144)
TOTAL PROTEIN: 7.3 G/DL (ref 6.4–8.3)
TRIGL SERPL-MCNC: 75 MG/DL
VITAMIN D 25-HYDROXY: 15.2 NG/ML (ref 30–100)
VLDLC SERPL CALC-MCNC: NORMAL MG/DL (ref 1–30)
WBC # BLD: 9.3 K/UL (ref 3.5–11.3)
WBC # BLD: ABNORMAL 10*3/UL

## 2020-06-12 RX ORDER — ESCITALOPRAM OXALATE 10 MG/1
TABLET ORAL
Qty: 30 TABLET | Refills: 0 | Status: SHIPPED | OUTPATIENT
Start: 2020-06-12 | End: 2020-07-15

## 2020-06-17 ENCOUNTER — OFFICE VISIT (OUTPATIENT)
Dept: OBGYN CLINIC | Age: 50
End: 2020-06-17
Payer: COMMERCIAL

## 2020-06-17 VITALS
SYSTOLIC BLOOD PRESSURE: 132 MMHG | HEIGHT: 61 IN | WEIGHT: 231 LBS | BODY MASS INDEX: 43.61 KG/M2 | DIASTOLIC BLOOD PRESSURE: 88 MMHG | TEMPERATURE: 97.8 F

## 2020-06-17 PROCEDURE — 99396 PREV VISIT EST AGE 40-64: CPT | Performed by: NURSE PRACTITIONER

## 2020-06-17 ASSESSMENT — PATIENT HEALTH QUESTIONNAIRE - PHQ9
SUM OF ALL RESPONSES TO PHQ QUESTIONS 1-9: 0
1. LITTLE INTEREST OR PLEASURE IN DOING THINGS: 0
2. FEELING DOWN, DEPRESSED OR HOPELESS: 0
SUM OF ALL RESPONSES TO PHQ QUESTIONS 1-9: 0
SUM OF ALL RESPONSES TO PHQ9 QUESTIONS 1 & 2: 0

## 2020-06-17 NOTE — PROGRESS NOTES
History and Physical  [address], [address] [facility] [address] [address] [facility] [phone]   Fax [phone]  Scotty Alanis  2021              47 y.o. Chief Complaint   Patient presents with    Gynecologic Exam       Patient's last menstrual period was 2016. Primary Care Physician: Yareli Starr PA-C    The patient was seen and examined. She has no chief complaint today and is here for her annual exam.  Her bowels are regular. There are no voiding complaints. She denies any bloating. She denies vaginal discharge and was counseled on STD's and the need for barrier contraception. HPI : Scotty Alanis is a 47 y.o. female [facility]    Annual exam  Currently restarted what she thinks is paxil for hot flashes, night sweats. Paxil gives her nightmares and bad visions. Agrees to switch to lexapro. Patient to go home and call with current name of medication so we can know for sure what is causing side effects. Hx of hypertension and is controlled with 2 different hypertensive medications. Hx of hysterectomy in 10/2016 for heavy menses and fibroids with DR Vivi Wilhelm. Previous biopsies of cervix and endometrial lining prior to hyst- benign.    Surgical pathology 10/2016 benign- no evidence of dysplasia.  ________________________________________________________________________  OB History    Para Term  AB Living   0 0 0 0 0 0   SAB TAB Ectopic Molar Multiple Live Births   0 0 0 0 0 0     Past Medical History:   Diagnosis Date    Anxiety     Cervical ca ([address]     Hyperlipidemia     no meds    Hypertension                                                                    Past Surgical History:   Procedure Laterality Date    CHOLECYSTECTOMY      COLONOSCOPY      in her early 's    COLONOSCOPY N/A 2019    COLORECTAL CANCER SCREENING, HIGH RISK performed by Justyna Dangelo MD at [place] [id number] Right     rt middle finger    MIGUEL AND BSO  10/05/2016    TUBAL LIGATION       Family History   Problem Relation Age of Onset    High Blood Pressure Mother     Diabetes Mother     Other Mother         coronary thrombosis    Early Death Mother     Depression Mother     Diabetes Father     High Cholesterol Father     High Blood Pressure Father     Stroke Father     Diabetes Sister     High Blood Pressure Sister     High Blood Pressure Brother     Depression Maternal Grandmother     Cancer Maternal Grandfather         leukemia    Heart Disease Maternal Grandfather         MI    High Blood Pressure Sister     Mental Illness Brother         anxiety     Social History     Socioeconomic History    Marital status: Single     Spouse name: Not on file    Number of children: Not on file    Years of education: Not on file    Highest education level: Not on file   Occupational History    Not on file   Social Needs    Financial resource strain: Not on file    Food insecurity     Worry: Not on file     Inability: Not on file    Transportation needs     Medical: Not on file     Non-medical: Not on file   Tobacco Use    Smoking status: Never Smoker    Smokeless tobacco: Never Used   Substance and Sexual Activity    Alcohol use: No     Alcohol/week: 0.0 standard drinks    Drug use: No    Sexual activity: Not on file   Lifestyle    Physical activity     Days per week: Not on file     Minutes per session: Not on file    Stress: Not on file   Relationships    Social connections     Talks on phone: Not on file     Gets together: Not on file     Attends Zoroastrian service: Not on file     Active member of club or organization: Not on file     Attends meetings of clubs or organizations: Not on file     Relationship status: Not on file    Intimate partner violence     Fear of current or ex partner: Not on file     Emotionally abused: Not on file     Physically abused: Not on file     Forced sexual activity: Not on file   Other Topics Concern    Not on file   Social History Narrative    Not on file       MEDICATIONS:  Current Outpatient Medications   Medication Sig Dispense Refill    escitalopram (LEXAPRO) 10 MG tablet Take 1 tablet by mouth once daily 30 tablet 0    amLODIPine (NORVASC) 10 MG tablet TAKE 1 TABLET BY MOUTH ONCE DAILY 90 tablet 1    lisinopril (PRINIVIL;ZESTRIL) 10 MG tablet TAKE 1 TABLET BY MOUTH ONCE DAILY 90 tablet 1    chlorzoxazone (PARAFON FORTE) 500 MG tablet Take 1 tablet by mouth 3 times daily as needed for Muscle spasms 90 tablet 3    naproxen (NAPROSYN) 500 MG tablet Take 1 tablet by mouth 2 times daily (with meals) 60 tablet 3    vitamin D (ERGOCALCIFEROL) 1.25 MG (10002 UT) CAPS capsule Take 1 capsule by mouth once a week 12 capsule 1    nebivolol (BYSTOLIC) 10 MG tablet Take 2 tablets by mouth daily (Patient not taking: Reported on 6/17/2020) 35 tablet 0    bisacodyl (DULCOLAX) 5 MG EC tablet TAKE 2 TABS AT 9 AM DAY PRIOR TO COLONOSCOPY (Patient not taking: Reported on 1/29/2020) 2 tablet 0     No current facility-administered medications for this visit. ALLERGIES:  Allergies as of 06/17/2020    (No Known Allergies)       Symptoms of decreased mood absent  Symptoms of anhedonia absent    **If either question is answered in a  positive fashion then complete the PHQ9 Scoring Evaluation and make the appropriate referral**      Immunization status: stated as current, but no records available. Gynecologic History:  Menarche: 14 yo  Menopause at 53 yo     Patient's last menstrual period was 09/06/2016. Sexually Active: No    STD History: No     Permanent Sterilization: Yes hyst   Reversible Birth Control: No        Hormone Replacement Exposure: yes- no longer taking it     Genetic Qualified Family History of Breast, Ovarian , Colon or Uterine Cancer: No     If YES see scanned worksheet.     Preventative Health Testing:    Health Maintenance:  Health Maintenance Due Topic Date Due    DTaP/Tdap/Td vaccine (1 - Tdap) 09/02/1988    Shingles Vaccine (1 of 2) 09/02/2019       Date of Last Pap Smear: 3/15/2016 ASCUS  Abnormal Pap Smear History: denies hx of LEEP, cone biopsy  Colposcopy History:   Date of Last Mammogram: 9/3/2018 neg  Date of Last Colonoscopy: 10/2019 Dr Lesa Bradford  Date of Last Bone Density:      ________________________________________________________________________        REVIEW OF SYSTEMS:    yes   A minimum of an eleven point review of systems was completed. Review Of Systems (11 point):  Constitutional: No fever, chills or malaise; No weight change or fatigue  Head and Eyes: No vision, Headache, Dizziness or trauma in last 12 months  ENT ROS: No hearing, Tinnitis, sinus or taste problems  Hematological and Lymphatic ROS:No Lymphoma, Von Willebrand's, Hemophillia or Bleeding History  Psych ROS: No Depression, Homicidal thoughts,suicidal thoughts, or anxiety  Breast ROS: No prior breast abnormalities or lumps  Respiratory ROS: No SOB, Pneumoniae,Cough, or Pulmonary Embolism History  Cardiovascular ROS: No Chest Pain with Exertion, Palpitations, Syncope, Edema, Arrhythmia. + hypertension,hyperlipidemia  Gastrointestinal ROS: No Indigestion, Heartburn, Nausea, vomiting, Diarrhea, Constipation,or Bowel Changes; No Bloody Stools or melena  Genito-Urinary ROS: No Dysuria, Hematuria or Nocturia.  No Urinary Incontinence or Vaginal Discharge  Musculoskeletal ROS: No Arthralgia, Arthritis,Gout,Osteoporosis or Rheumatism  Neurological ROS: No CVA, Migraines, Epilepsy, Seizure Hx, or Limb Weakness  Dermatological ROS: No Rash, Itching, Hives, Mole Changes or Cancer                                                                                                                                                                                                                                  PHYSICAL Exam:     Constitutional:  Vitals:    06/17/20 1608   BP: 132/88   Site: Right Upper Arm   Position: Sitting   Cuff Size: Medium Adult   Temp: 97.8 °F (36.6 °C)   Weight: 231 lb (104.8 kg)   Height: 5' 1\" (1.549 m)         General Appearance: This  is a well Developed, well Nourished, well groomed female. Her BMI was reviewed. Nutritional decision making was discussed. Skin:  There was a Normal Inspection of the skin without rashes or lesions. There were no rashes. (Papular, Maculopapular, Hives, Pustular, Macular)     There were no lesions (Ulcers, Erythema, Abn. Appearing Nevi)            Lymphatic:  No Lymph Nodes were Palpable in the neck , axilla or groin.  0 # Of Lymph Nodes; Location ; Character [Normal]  [Shotty] [Tender] [Enlarged]     Neck and EENT:  The neck was supple. There were no masses   The thyroid was not enlarged and had no masses. Perrla, EOMI B/L, TMI B/L No Abnormalities. Throat inspected-No exudates or Masses, Nares Patent No Masses        Respiratory: The lungs were auscultated and found to be clear. There were no rales, rhonchi or wheezes. There was a good respiratory effort. Cardiovascular: The heart was in a regular rate and rhythm. . No S3 or S4. There was no murmur appreciated. Location, grade, and radiation are not applicable. Extremities: The patients extremities were without calf tenderness, edema, or varicosities. There was full range of motion in all four extremities. Pulses in all four extremities were appreciated and are 2/4. Abdomen: The abdomen was soft and non-tender. There were good bowel sounds in all quadrants and there was no guarding, rebound or rigidity. On evaluation there was no evidence of hepatosplenomegaly and there was no costal vertebral xavier tenderness bilaterally. No hernias were appreciated. Abdominal Scars: C/W previous surgeries    Psych:   The patient had a normal Orientation to: Time, Place, Person, and Situation  There is no Mood / Affect changes    Breast:  (Chest)  normal appearance, no

## 2020-07-15 RX ORDER — ESCITALOPRAM OXALATE 10 MG/1
TABLET ORAL
Qty: 30 TABLET | Refills: 0 | Status: SHIPPED | OUTPATIENT
Start: 2020-07-15 | End: 2020-09-01

## 2020-07-15 NOTE — TELEPHONE ENCOUNTER
Patient switched to lexapro June 17, 2020 at time of annual exam with Malathi Austin CNP. Patient requesting refill.

## 2020-07-27 ENCOUNTER — APPOINTMENT (OUTPATIENT)
Dept: ULTRASOUND IMAGING | Facility: CLINIC | Age: 50
End: 2020-07-27
Payer: COMMERCIAL

## 2020-07-27 ENCOUNTER — HOSPITAL ENCOUNTER (EMERGENCY)
Facility: CLINIC | Age: 50
Discharge: HOME | End: 2020-07-27
Attending: EMERGENCY MEDICINE
Payer: COMMERCIAL

## 2020-07-27 VITALS
RESPIRATION RATE: 16 BRPM | OXYGEN SATURATION: 97 % | DIASTOLIC BLOOD PRESSURE: 89 MMHG | HEART RATE: 114 BPM | WEIGHT: 228 LBS | HEIGHT: 61 IN | SYSTOLIC BLOOD PRESSURE: 140 MMHG | TEMPERATURE: 97.3 F | BODY MASS INDEX: 43.05 KG/M2

## 2020-07-27 LAB
ANION GAP SERPL CALCULATED.3IONS-SCNC: 11 MMOL/L (ref 9–17)
BUN BLDV-MCNC: 23 MG/DL (ref 6–20)
BUN/CREAT BLD: ABNORMAL (ref 9–20)
CALCIUM SERPL-MCNC: 8.9 MG/DL (ref 8.6–10.4)
CHLORIDE BLD-SCNC: 103 MMOL/L (ref 98–107)
CO2: 24 MMOL/L (ref 20–31)
CREAT SERPL-MCNC: 0.6 MG/DL (ref 0.5–0.9)
GFR AFRICAN AMERICAN: >60 ML/MIN
GFR NON-AFRICAN AMERICAN: >60 ML/MIN
GFR SERPL CREATININE-BSD FRML MDRD: ABNORMAL ML/MIN/{1.73_M2}
GFR SERPL CREATININE-BSD FRML MDRD: ABNORMAL ML/MIN/{1.73_M2}
GLUCOSE BLD-MCNC: 103 MG/DL (ref 70–99)
POTASSIUM SERPL-SCNC: 4.6 MMOL/L (ref 3.7–5.3)
SODIUM BLD-SCNC: 138 MMOL/L (ref 135–144)

## 2020-07-27 PROCEDURE — 99284 EMERGENCY DEPT VISIT MOD MDM: CPT

## 2020-07-27 PROCEDURE — 80048 BASIC METABOLIC PNL TOTAL CA: CPT

## 2020-07-27 PROCEDURE — 93971 EXTREMITY STUDY: CPT

## 2020-07-27 PROCEDURE — 6370000000 HC RX 637 (ALT 250 FOR IP): Performed by: EMERGENCY MEDICINE

## 2020-07-27 PROCEDURE — 36415 COLL VENOUS BLD VENIPUNCTURE: CPT

## 2020-07-27 RX ORDER — HYDROCODONE BITARTRATE AND ACETAMINOPHEN 5; 325 MG/1; MG/1
1 TABLET ORAL ONCE
Status: COMPLETED | OUTPATIENT
Start: 2020-07-27 | End: 2020-07-27

## 2020-07-27 RX ORDER — HYDROCODONE BITARTRATE AND ACETAMINOPHEN 5; 325 MG/1; MG/1
1 TABLET ORAL EVERY 4 HOURS PRN
Qty: 6 TABLET | Refills: 0 | Status: SHIPPED | OUTPATIENT
Start: 2020-07-27 | End: 2020-07-29

## 2020-07-27 RX ADMIN — HYDROCODONE BITARTRATE AND ACETAMINOPHEN 1 TABLET: 5; 325 TABLET ORAL at 09:55

## 2020-07-27 ASSESSMENT — PAIN SCALES - GENERAL
PAINLEVEL_OUTOF10: 8
PAINLEVEL_OUTOF10: 10

## 2020-07-27 NOTE — ED PROVIDER NOTES
[facility]  [facility]  Phone: [phone]        Pt Name: Priya Eisenberg  MRN: [id number]  [place] 1970  Date of evaluation: 7/27/20      CHIEF COMPLAINT     Chief Complaint   Patient presents with    Leg Pain         HISTORY OF PRESENT ILLNESS  (Location/Symptom, Timing/Onset, Context/Setting, Quality, Duration, Modifying Factors, Severity.)    Priya Eisenberg is a 47 y.o. female who presents with left leg pain. Patient states that she had a similar problem about 2 months ago, and it resolved spontaneously. Patient describes the pain as pressure. It is nonradiating. No numbness, tingling, or weakness. She states she cannot sleep, and feels that she cannot sit still. She states that she has to get up and walk around when the pain comes on. She is already on medications for muscle spasms, and take naproxen at home. She states that she does not feel any lumps or bumps there. She does get some relief with walking. She states that she had an episode of nausea and vomiting this morning. She states that she ate doughnuts and drink orange juice, and threw that up. She was evaluated at [facility] on Thursday for the same issue, and she states that they did a blood test looking for blood clots and released her. She states that she did not have an ultrasound during that ED visit. She was given Toradol for pain, which did not help her at all. She has no prior history of venous thromboembolism. No recent travel. No recent surgery. No chest pain or shortness of breath. She is not coughing up any blood. No new swelling in her legs. She states that she is on a medication for hot flashes. She does not know which medication that is however.       REVIEW OF SYSTEMS    (2-9 systems for level 4, 10 or more for level 5)     Constitutional: no fever, chills, fatigue  HENT: No headache, nasal congestion, sore throat, hearing changes, ear pain or discharge  Eyes: no visual changes or photophobia  Respiratory: no cough, shortness of breath, or wheezing  Cardiovascular: no chest pain, palpitations, or leg swelling  Abdominal: no abdominal pain, diarrhea, or constipation, +nausea and vomiting  Genitourinary: no dysuria, frequency, or urgency  Musculoskeletal: no arthralgias, myalgias, neck or back pain, +leg pain  Skin: no rash or wound  Neurological: no numbness, tingling or weakness  Hematologic:  no history of easy bleeding or bruising            PAST MEDICAL HISTORY    has a past medical history of Anxiety, Cervical ca ([address] Hyperlipidemia, and Hypertension. SURGICAL HISTORY      has a past surgical history that includes Tubal ligation; Cholecystectomy; Skin graft (Right); frandy and bso (cervix removed) (10/05/2016); Hysterectomy; Colonoscopy; and Colonoscopy (N/A, 2019). CURRENTMEDICATIONS       Previous Medications    AMLODIPINE (NORVASC) 10 MG TABLET    TAKE 1 TABLET BY MOUTH ONCE DAILY    BISACODYL (DULCOLAX) 5 MG EC TABLET    TAKE 2 TABS AT 9 AM DAY PRIOR TO COLONOSCOPY    CHLORZOXAZONE (PARAFON FORTE) 500 MG TABLET    Take 1 tablet by mouth 3 times daily as needed for Muscle spasms    ESCITALOPRAM (LEXAPRO) 10 MG TABLET    Take 1 tablet by mouth once daily    LISINOPRIL (PRINIVIL;ZESTRIL) 10 MG TABLET    TAKE 1 TABLET BY MOUTH ONCE DAILY    NAPROXEN (NAPROSYN) 500 MG TABLET    Take 1 tablet by mouth 2 times daily (with meals)    NEBIVOLOL (BYSTOLIC) 10 MG TABLET    Take 2 tablets by mouth daily    VITAMIN D (ERGOCALCIFEROL) 1.25 MG (67598 UT) CAPS CAPSULE    Take 1 capsule by mouth once a week       ALLERGIES     has No Known Allergies. FAMILY HISTORY     She indicated that her mother is . She indicated that her father is . She indicated that both of her sisters are alive. She indicated that both of her brothers are alive. She indicated that her maternal grandmother is . She indicated that her maternal grandfather is .  She indicated that her paternal grandmother is . She indicated that her paternal grandfather is . family history includes Cancer in her maternal grandfather; Depression in her maternal grandmother and mother; Diabetes in her father, mother, and sister; Early Death in her mother; Heart Disease in her maternal grandfather; High Blood Pressure in her brother, father, mother, sister, and sister; High Cholesterol in her father; Mental Illness in her brother; Other in her mother; Stroke in her father. SOCIAL HISTORY      reports that she has never smoked. She has never used smokeless tobacco. She reports that she does not drink alcohol or use drugs. PHYSICAL EXAM    (up to 7 for level 4, 8 or more for level 5)   INITIAL VITALS:  height is 5' 1\" (1.549 m) and weight is 103.4 kg (228 lb). Her oral temperature is 97.3 °F (36.3 °C). Her blood pressure is 140/89 (abnormal) and her pulse is 114. Her respiration is 16 and oxygen saturation is 97%. Physical Exam  Vitals signs and nursing note reviewed. Constitutional:       Appearance: Normal appearance. HENT:      Head: Normocephalic and atraumatic. Eyes:      Extraocular Movements: Extraocular movements intact. Pupils: Pupils are equal, round, and reactive to light. Musculoskeletal: Normal range of motion. General: Tenderness present. No swelling, deformity or signs of injury. Right lower leg: No edema. Left lower leg: No edema. Comments: Tenderness to palpation on the lateral aspect of the left lower leg. No palpable cords. Negative Homans sign. No evidence of muscle spasm. No deformity. Dorsalis pedis and posterior tibial artery pulses are palpable and symmetrical bilaterally. The feet are warm. Skin:     General: Skin is warm and dry. Capillary Refill: Capillary refill takes less than 2 seconds. Neurological:      Mental Status: She is alert and oriented to person, place, and time.  Mental status is at baseline. Psychiatric:         Mood and Affect: Mood normal.         Behavior: Behavior normal.         DIFFERENTIAL DIAGNOSIS/ MDM:     69-year-old female here with left leg pain. The way the patient describes her discomfort, it almost sounds like her restless legs type presentation. She states that she has to get up and walk around, to get rid of the pain. She does have a potential risk factor for DVT, since she is on a medication to treat hot flashes though she does not know what it is. She is unsure if it is an estrogen-based medication. I do not see any estrogens on her medication list.  She denies any history of trauma, and there is no evidence of trauma on exam.  I do not think this is an arterial insufficiency related issue, as the patient has readily palpable pulses in her feet are warm to the touch. She is drinking some sort of electrolyte based drink to replace drinking pop, so I did check her electrolytes which were essentially normal.  She had a venous Doppler of her left leg which was likewise normal.  Patient did get significant relief with the Norco.  I gave her a small prescription for Norco, and instructed her to follow-up with her primary care. DIAGNOSTIC RESULTS     EKG: All EKG's are interpreted by the Emergency Department Physician who either signs or Co-signs this chart in the [address] a cardiologist.    none    RADIOLOGY:  Non-plain film images such as CT, Ultrasound and MRI are read by the radiologist. César Singleton radiographic images are visualized and the radiologist interpretations are reviewed as follows:         Interpretation per the Radiologist below, if available at the time of this note:    Us Dup Lower Extremity Left Young    Result Date: 7/27/2020  EXAMINATION: DUPLEX VENOUS ULTRASOUND OF THE LEFT LOWER EXTREMITY 7/27/2020 10:07 am TECHNIQUE: Duplex ultrasound and Doppler images were obtained of the left lower extremity. COMPARISON: None.  HISTORY: ORDERING SYSTEM PROVIDED HISTORY: r/o DVT Reason for Exam: Left calf pain x several days Acuity: Acute Type of Exam: Initial FINDINGS: The visualized veins of the left lower extremity are patent and free of echogenic thrombus. The veins are normally compressible and have normal phasic flow. No evidence of DVT in the left lower extremity. LABS:  Results for orders placed or performed during the hospital encounter of 07/43/70   Basic Metabolic Panel   Result Value Ref Range    Glucose 103 (H) 70 - 99 mg/dL    BUN 23 (H) 6 - 20 mg/dL    CREATININE 0.60 0.50 - 0.90 mg/dL    Bun/Cre Ratio NOT REPORTED 9 - 20    Calcium 8.9 8.6 - 10.4 mg/dL    Sodium 138 135 - 144 mmol/L    Potassium 4.6 3.7 - 5.3 mmol/L    Chloride 103 98 - 107 mmol/L    CO2 24 20 - 31 mmol/L    Anion Gap 11 9 - 17 mmol/L    GFR Non-African American >60 >60 mL/min    GFR African American >60 >60 mL/min    GFR Comment          GFR Staging NOT REPORTED        Essentially unremarkable BNP    EMERGENCY DEPARTMENT COURSE:   Vitals:    Vitals:    07/27/20 0929   BP: (!) 140/89   Pulse: 114   Resp: 16   Temp: 97.3 °F (36.3 °C)   TempSrc: Oral   SpO2: 97%   Weight: 103.4 kg (228 lb)   Height: 5' 1\" (1.549 m)     -------------------------  BP: (!) 140/89, Temp: 97.3 °F (36.3 °C), Pulse: 114, Resp: 16          CONSULTS:  none    PROCEDURES:  None    FINAL IMPRESSION      1. Left leg pain          DISPOSITION/PLAN   DISPOSITION Decision To Discharge 07/27/2020 11:34:17 AM      CONDITION ON DISPOSITION:   stable    PATIENT REFERRED TO:  Oh Salamanca PA-C  [address] [facility]  [address] [facility],[address]  [address] [facility] [address] [phone]    Schedule an appointment as soon as possible for a visit in 2 days        DISCHARGE MEDICATIONS:  New Prescriptions    HYDROCODONE-ACETAMINOPHEN (NORCO) 5-325 MG PER TABLET    Take 1 tablet by mouth every 4 hours as needed for Pain for up to 2 days. Intended supply: 3 days.  Take lowest dose possible to manage pain       (Please note that portions of this note were completed with a voicerecognition program.  Efforts were made to edit the dictations but occasionally words are mis-transcribed.)    Dee Valerio MD  Attending Emergency Medicine Physician        Dee Valerio MD  07/27/20 2300

## 2020-09-01 RX ORDER — ESCITALOPRAM OXALATE 10 MG/1
TABLET ORAL
Qty: 30 TABLET | Refills: 6 | Status: SHIPPED | OUTPATIENT
Start: 2020-09-01 | End: 2021-10-08 | Stop reason: SDUPTHER

## 2021-01-29 ENCOUNTER — HOSPITAL ENCOUNTER (OUTPATIENT)
Age: 51
Setting detail: SPECIMEN
Discharge: HOME | End: 2021-01-29
Payer: COMMERCIAL

## 2021-01-29 DIAGNOSIS — F41.9 ANXIETY: ICD-10-CM

## 2021-01-29 DIAGNOSIS — E78.5 HYPERLIPIDEMIA, UNSPECIFIED HYPERLIPIDEMIA TYPE: ICD-10-CM

## 2021-01-29 DIAGNOSIS — I10 ESSENTIAL HYPERTENSION: ICD-10-CM

## 2021-01-29 DIAGNOSIS — E55.9 VITAMIN D DEFICIENCY: ICD-10-CM

## 2021-01-29 DIAGNOSIS — Z13.1 ENCOUNTER FOR SCREENING FOR DIABETES MELLITUS: ICD-10-CM

## 2021-01-29 LAB
ABSOLUTE EOS #: 0.43 K/UL (ref 0–0.44)
ABSOLUTE IMMATURE GRANULOCYTE: 0.05 K/UL (ref 0–0.3)
ABSOLUTE LYMPH #: 2.49 K/UL (ref 1.1–3.7)
ABSOLUTE MONO #: 0.58 K/UL (ref 0.1–1.2)
BASOPHILS # BLD: 1 % (ref 0–2)
BASOPHILS ABSOLUTE: 0.11 K/UL (ref 0–0.2)
DIFFERENTIAL TYPE: ABNORMAL
EOSINOPHILS RELATIVE PERCENT: 4 % (ref 1–4)
HCT VFR BLD CALC: 47.3 % (ref 36.3–47.1)
HEMOGLOBIN: 14.1 G/DL (ref 11.9–15.1)
IMMATURE GRANULOCYTES: 0 %
LYMPHOCYTES # BLD: 22 % (ref 24–43)
MCH RBC QN AUTO: 23.8 PG (ref 25.2–33.5)
MCHC RBC AUTO-ENTMCNC: 29.8 G/DL (ref 28.4–34.8)
MCV RBC AUTO: 79.9 FL (ref 82.6–102.9)
MONOCYTES # BLD: 5 % (ref 3–12)
NRBC AUTOMATED: 0 PER 100 WBC
PDW BLD-RTO: 18.7 % (ref 11.8–14.4)
PLATELET # BLD: 391 K/UL (ref 138–453)
PLATELET ESTIMATE: ABNORMAL
PMV BLD AUTO: 10.1 FL (ref 8.1–13.5)
RBC # BLD: 5.92 M/UL (ref 3.95–5.11)
RBC # BLD: ABNORMAL 10*6/UL
SEG NEUTROPHILS: 68 % (ref 36–65)
SEGMENTED NEUTROPHILS ABSOLUTE COUNT: 7.55 K/UL (ref 1.5–8.1)
VITAMIN D 25-HYDROXY: 24.1 NG/ML (ref 30–100)
WBC # BLD: 11.2 K/UL (ref 3.5–11.3)
WBC # BLD: ABNORMAL 10*3/UL

## 2021-01-30 LAB
ALBUMIN SERPL-MCNC: 4 G/DL (ref 3.5–5.2)
ALBUMIN/GLOBULIN RATIO: 1.1 (ref 1–2.5)
ALP BLD-CCNC: 100 U/L (ref 35–104)
ALT SERPL-CCNC: 22 U/L (ref 5–33)
ANION GAP SERPL CALCULATED.3IONS-SCNC: 13 MMOL/L (ref 9–17)
AST SERPL-CCNC: 26 U/L
BILIRUB SERPL-MCNC: 0.42 MG/DL (ref 0.3–1.2)
BUN BLDV-MCNC: 21 MG/DL (ref 6–20)
BUN/CREAT BLD: ABNORMAL (ref 9–20)
CALCIUM SERPL-MCNC: 9.1 MG/DL (ref 8.6–10.4)
CHLORIDE BLD-SCNC: 102 MMOL/L (ref 98–107)
CHOLESTEROL/HDL RATIO: 4.1
CHOLESTEROL: 204 MG/DL
CO2: 21 MMOL/L (ref 20–31)
CREAT SERPL-MCNC: 0.77 MG/DL (ref 0.5–0.9)
ESTIMATED AVERAGE GLUCOSE: 120 MG/DL
GFR AFRICAN AMERICAN: >60 ML/MIN
GFR NON-AFRICAN AMERICAN: >60 ML/MIN
GFR SERPL CREATININE-BSD FRML MDRD: ABNORMAL ML/MIN/{1.73_M2}
GFR SERPL CREATININE-BSD FRML MDRD: ABNORMAL ML/MIN/{1.73_M2}
GLUCOSE BLD-MCNC: 77 MG/DL (ref 70–99)
HBA1C MFR BLD: 5.8 % (ref 4–6)
HDLC SERPL-MCNC: 50 MG/DL
LDL CHOLESTEROL: 139 MG/DL (ref 0–130)
POTASSIUM SERPL-SCNC: 4.5 MMOL/L (ref 3.7–5.3)
SODIUM BLD-SCNC: 136 MMOL/L (ref 135–144)
TOTAL PROTEIN: 7.7 G/DL (ref 6.4–8.3)
TRIGL SERPL-MCNC: 73 MG/DL
VLDLC SERPL CALC-MCNC: ABNORMAL MG/DL (ref 1–30)

## 2021-02-05 ENCOUNTER — TELEPHONE (OUTPATIENT)
Dept: ONCOLOGY | Age: 51
End: 2021-02-05

## 2021-02-05 NOTE — TELEPHONE ENCOUNTER
Received internal referral from Darya Richter PA-C for pt to see Dr. Nadiya David for D75.1 (ICD-10-CM) - Erythrocytosis. LM for pt to call back and schedule consult. Referral is in deferred work-q.     Electronically signed by Ynes Carrasquillo on 2/5/2021 at 9:21 AM

## 2021-03-01 ENCOUNTER — TELEPHONE (OUTPATIENT)
Dept: ONCOLOGY | Age: 51
End: 2021-03-01

## 2021-03-01 ENCOUNTER — HOSPITAL ENCOUNTER (OUTPATIENT)
Age: 51
Discharge: HOME | End: 2021-03-01
Payer: COMMERCIAL

## 2021-03-01 ENCOUNTER — INITIAL CONSULT (OUTPATIENT)
Dept: ONCOLOGY | Age: 51
End: 2021-03-01
Payer: COMMERCIAL

## 2021-03-01 VITALS
DIASTOLIC BLOOD PRESSURE: 84 MMHG | BODY MASS INDEX: 44.18 KG/M2 | SYSTOLIC BLOOD PRESSURE: 139 MMHG | HEART RATE: 76 BPM | TEMPERATURE: 97.9 F | WEIGHT: 233.7 LBS

## 2021-03-01 DIAGNOSIS — D75.1 ERYTHROCYTOSIS: ICD-10-CM

## 2021-03-01 DIAGNOSIS — D75.1 ERYTHROCYTOSIS: Primary | ICD-10-CM

## 2021-03-01 DIAGNOSIS — R71.8 MICROCYTOSIS: ICD-10-CM

## 2021-03-01 LAB
ABSOLUTE EOS #: 0.3 K/UL (ref 0–0.4)
ABSOLUTE IMMATURE GRANULOCYTE: ABNORMAL K/UL (ref 0–0.3)
ABSOLUTE LYMPH #: 2.3 K/UL (ref 1–4.8)
ABSOLUTE MONO #: 0.5 K/UL (ref 0.1–1.2)
BASOPHILS # BLD: 2 % (ref 0–2)
BASOPHILS ABSOLUTE: 0.1 K/UL (ref 0–0.2)
CARBOXYHEMOGLOBIN: 2.2 % (ref 0–5)
DIFFERENTIAL TYPE: ABNORMAL
EOSINOPHILS RELATIVE PERCENT: 3 % (ref 1–4)
FERRITIN: 52 UG/L (ref 13–150)
HCT VFR BLD CALC: 43.8 % (ref 36–46)
HEMOGLOBIN: 14 G/DL (ref 12–16)
IMMATURE GRANULOCYTES: ABNORMAL %
IRON SATURATION: 15 % (ref 20–55)
IRON: 49 UG/DL (ref 37–145)
LYMPHOCYTES # BLD: 28 % (ref 24–44)
MCH RBC QN AUTO: 24.1 PG (ref 26–34)
MCHC RBC AUTO-ENTMCNC: 31.9 G/DL (ref 31–37)
MCV RBC AUTO: 75.5 FL (ref 80–100)
MONOCYTES # BLD: 6 % (ref 2–11)
NRBC AUTOMATED: ABNORMAL PER 100 WBC
PDW BLD-RTO: 19.4 % (ref 12.5–15.4)
PLATELET # BLD: 380 K/UL (ref 140–450)
PLATELET ESTIMATE: ABNORMAL
PMV BLD AUTO: 8 FL (ref 6–12)
RBC # BLD: 5.79 M/UL (ref 4–5.2)
RBC # BLD: ABNORMAL 10*6/UL
SEG NEUTROPHILS: 61 % (ref 36–66)
SEGMENTED NEUTROPHILS ABSOLUTE COUNT: 5.1 K/UL (ref 1.8–7.7)
TOTAL IRON BINDING CAPACITY: 332 UG/DL (ref 250–450)
UNSATURATED IRON BINDING CAPACITY: 283 UG/DL (ref 112–347)
WBC # BLD: 8.3 K/UL (ref 3.5–11)
WBC # BLD: ABNORMAL 10*3/UL

## 2021-03-01 PROCEDURE — 82728 ASSAY OF FERRITIN: CPT

## 2021-03-01 PROCEDURE — 82668 ASSAY OF ERYTHROPOIETIN: CPT

## 2021-03-01 PROCEDURE — 99245 OFF/OP CONSLTJ NEW/EST HI 55: CPT | Performed by: INTERNAL MEDICINE

## 2021-03-01 PROCEDURE — 83540 ASSAY OF IRON: CPT

## 2021-03-01 PROCEDURE — G8417 CALC BMI ABV UP PARAM F/U: HCPCS | Performed by: INTERNAL MEDICINE

## 2021-03-01 PROCEDURE — G8484 FLU IMMUNIZE NO ADMIN: HCPCS | Performed by: INTERNAL MEDICINE

## 2021-03-01 PROCEDURE — 85025 COMPLETE CBC W/AUTO DIFF WBC: CPT

## 2021-03-01 PROCEDURE — 83550 IRON BINDING TEST: CPT

## 2021-03-01 PROCEDURE — 99202 OFFICE O/P NEW SF 15 MIN: CPT | Performed by: INTERNAL MEDICINE

## 2021-03-01 PROCEDURE — 82375 ASSAY CARBOXYHB QUANT: CPT

## 2021-03-01 PROCEDURE — 81270 JAK2 GENE: CPT

## 2021-03-01 PROCEDURE — G8427 DOCREV CUR MEDS BY ELIG CLIN: HCPCS | Performed by: INTERNAL MEDICINE

## 2021-03-01 PROCEDURE — 36415 COLL VENOUS BLD VENIPUNCTURE: CPT

## 2021-03-01 RX ORDER — IBUPROFEN 800 MG/1
800 TABLET ORAL EVERY 6 HOURS PRN
COMMUNITY
End: 2022-04-08

## 2021-03-01 NOTE — PROGRESS NOTES
_               Ms. Alessandro Steel is a very pleasant 45 y.o. female with history of multiple co morbidities as listed. Patient is referred for further management of erythrocytosis. Patient was noted to have elevation of red blood cells in several locations. She was referred for further regimen. Patient has no symptoms related to this problem. She denies any chest pain. No headaches. No dizziness. No TIA or CVA-like symptoms. Patient denies any fever or night sweats. No weight loss or decreased appetite. No enlarged lymph nodes. The patient is non-smoker. She snores at night. She has hypertension. No smoking or alcohol drinking. Olayinka Brito PAST MEDICAL HISTORY: has a past medical history of Anxiety, Cervical ca ([address] Hyperlipidemia, and Hypertension. PAST SURGICAL HISTORY: has a past surgical history that includes Tubal ligation; Cholecystectomy; Skin graft (Right); frandy and bso (cervix removed) (10/05/2016); Hysterectomy; Colonoscopy; and Colonoscopy (N/A, 11/11/2019). CURRENT MEDICATIONS:  has a current medication list which includes the following prescription(s): ibuprofen, vitamin d, diclofenac, escitalopram, amlodipine, lisinopril, chlorzoxazone, and gabapentin. ALLERGIES:  has No Known Allergies. FAMILY HISTORY: Negative for any hematological or oncological conditions. SOCIAL HISTORY:  reports that she has never smoked. She has never used smokeless tobacco. She reports that she does not drink alcohol or use drugs. REVIEW OF SYSTEMS:     · General: No weakness or fatigue. No unanticipated weight loss or decreased appetite. No fever or chills. · Eyes: No blurred vision, eye pain or double vision. · Ears: No hearing problems or drainage. No tinnitus. · Throat: No sore throat, problems with swallowing or dysphagia. · Respiratory: No cough, sputum or hemoptysis. No shortness of breath.  No pleuritic chest pain. · Cardiovascular: No chest pain, orthopnea or PND. No lower extremity edema. No palpitation. · Gastrointestinal: No problems with swallowing. No abdominal pain or bloating. No nausea or vomiting. No diarrhea or constipation. No GI bleeding. · Genitourinary: No dysuria, hematuria, frequency or urgency. · Musculoskeletal: No muscle aches or pains. No limitation of movement. No back pain. No gait disturbance, No joint complaints. · Dermatologic: No skin rashes or pruritus. No skin lesions or discolorations. · Psychiatric: No depression, anxiety, or stress or signs of schizophrenia. No change in mood or affect. · Hematologic: No history of bleeding tendency. No bruises or ecchymosis. No history of clotting problems. · Infectious disease: No fever, chills or frequent infections. · Endocrine: No polydipsia or polyuria. No temperature intolerance. · Neurologic: No headaches or dizziness. No weakness or numbness of the extremities. No changes in balance, coordination,  memory, mentation, behavior. · Allergic/Immunologic: No nasal congestion or hives. No repeated infections. PHYSICAL EXAM:  The patient is not in acute distress. Vital signs: Blood pressure 139/84, pulse 76, temperature 97.9 °F (36.6 °C), temperature source Oral, weight 233 lb 11.2 oz (106 kg), last menstrual period 09/06/2016, not currently breastfeeding.      General appearance - well appearing, not in pain or distress  Mental status - good mood, alert and oriented  Eyes - pupils equal and reactive, extraocular eye movements intact  Ears - bilateral TM's and external ear canals normal  Nose - normal and patent, no erythema, discharge or polyps  Mouth - mucous membranes moist, pharynx normal without lesions  Neck - supple, no significant adenopathy  Lymphatics - no palpable lymphadenopathy, no hepatosplenomegaly  Chest - clear to auscultation, no wheezes, rales or rhonchi, symmetric air entry  Heart - normal rate, regular rhythm, normal S1, S2, no murmurs, rubs, clicks or gallops  Abdomen - soft, nontender, nondistended, no masses or organomegaly  Neurological - alert, oriented, normal speech, no focal findings or movement disorder noted  Musculoskeletal - no joint tenderness, deformity or swelling  Extremities - peripheral pulses normal, no pedal edema, no clubbing or cyanosis  Skin - normal coloration and turgor, no rashes, no suspicious skin lesions noted     Review of Diagnostic data:   Lab Results   Component Value Date    WBC 8.3 03/01/2021    HGB 14.0 03/01/2021    HCT 43.8 03/01/2021    MCV 75.5 (L) 03/01/2021     03/01/2021       Chemistry        Component Value Date/Time     01/29/2021 1412    K 4.5 01/29/2021 1412     01/29/2021 1412    CO2 21 01/29/2021 1412    BUN 21 (H) 01/29/2021 1412    CREATININE 0.77 01/29/2021 1412        Component Value Date/Time    CALCIUM 9.1 01/29/2021 1412    ALKPHOS 100 01/29/2021 1412    AST 26 01/29/2021 1412    ALT 22 01/29/2021 1412    BILITOT 0.42 01/29/2021 1412            IMPRESSION:   Erythrocytosis  Microcytosis  Overweight  Hypertension  Probable sleep apnea      PLAN: I reviewed the labs available to me and discussed with the patient. For more than 60 minutes of face to face discussion, I explained to the patient the nature of this hematologic problem. I explained the significance of these abnormalities in layman language. Patient's labs over the last 5 years showed persistent elevation of RBCs count and slight elevation of hematocrit. In addition she was noted to have microcytosis. Is all asymptomatic. Patient is not symptomatic. Very likely dealing with reactive erythrocytosis. This is possibly related to underlying sleep apnea. I expect patient is having element of iron deficiency as well which is the reason for the microcytosis and possibly maintaining hemoglobin within normal range.   Further work-up will be done to look at that

## 2021-03-01 NOTE — LETTER
_    Chuck Richter MD    3/1/2021     Bryant Fields PA-C   [address] [address]    Dear Sparkle Brown: Thank you for referring Alessandro Steel, 1970, to me for evaluation. Below are the relevant portions of my assessment and plan of care. Ms. Alessandro Steel is a very pleasant 45 y.o. female with history of multiple co morbidities as listed. Patient is referred for further management of erythrocytosis. Patient was noted to have elevation of red blood cells in several locations. She was referred for further regimen. Patient has no symptoms related to this problem. She denies any chest pain. No headaches. No dizziness. No TIA or CVA-like symptoms. Patient denies any fever or night sweats. No weight loss or decreased appetite. No enlarged lymph nodes. The patient is non-smoker. She snores at night. She has hypertension. No smoking or alcohol drinking. Olayinka Brito PAST MEDICAL HISTORY: has a past medical history of Anxiety, Cervical ca ([address] Hyperlipidemia, and Hypertension. PAST SURGICAL HISTORY: has a past surgical history that includes Tubal ligation; Cholecystectomy; Skin graft (Right); frandy and bso (cervix removed) (10/05/2016); Hysterectomy; Colonoscopy; and Colonoscopy (N/A, 11/11/2019). CURRENT MEDICATIONS:  has a current medication list which includes the following prescription(s): ibuprofen, vitamin d, diclofenac, escitalopram, amlodipine, lisinopril, chlorzoxazone, and gabapentin. ALLERGIES:  has No Known Allergies. FAMILY HISTORY: Negative for any hematological or oncological conditions. SOCIAL HISTORY:  reports that she has never smoked. She has never used smokeless tobacco. She reports that she does not drink alcohol or use drugs. REVIEW OF SYSTEMS:     · General: No weakness or fatigue. No unanticipated weight loss or decreased appetite. No fever or chills. · Eyes: No blurred vision, eye pain or double vision. · Ears: No hearing problems or drainage. No tinnitus. · Throat: No sore throat, problems with swallowing or dysphagia. · Respiratory: No cough, sputum or hemoptysis. No shortness of breath. No pleuritic chest pain. · Cardiovascular: No chest pain, orthopnea or PND. No lower extremity edema. No palpitation. · Gastrointestinal: No problems with swallowing. No abdominal pain or bloating. No nausea or vomiting. No diarrhea or constipation. No GI bleeding. · Genitourinary: No dysuria, hematuria, frequency or urgency. · Musculoskeletal: No muscle aches or pains. No limitation of movement. No back pain. No gait disturbance, No joint complaints. · Dermatologic: No skin rashes or pruritus. No skin lesions or discolorations. · Psychiatric: No depression, anxiety, or stress or signs of schizophrenia. No change in mood or affect. · Hematologic: No history of bleeding tendency. No bruises or ecchymosis. No history of clotting problems. · Infectious disease: No fever, chills or frequent infections. · Endocrine: No polydipsia or polyuria. No temperature intolerance. · Neurologic: No headaches or dizziness. No weakness or numbness of the extremities. No changes in balance, coordination,  memory, mentation, behavior. · Allergic/Immunologic: No nasal congestion or hives. No repeated infections. PHYSICAL EXAM:  The patient is not in acute distress. Vital signs: Blood pressure 139/84, pulse 76, temperature 97.9 °F (36.6 °C), temperature source Oral, weight 233 lb 11.2 oz (106 kg), last menstrual period 09/06/2016, not currently breastfeeding.      General appearance - well appearing, not in pain or distress  Mental status - good mood, alert and oriented  Eyes - pupils equal and reactive, extraocular eye movements intact  Ears - bilateral TM's and external ear canals normal  Nose - normal and patent, no erythema, discharge or polyps  Mouth - mucous membranes moist, pharynx normal without lesions Neck - supple, no significant adenopathy  Lymphatics - no palpable lymphadenopathy, no hepatosplenomegaly  Chest - clear to auscultation, no wheezes, rales or rhonchi, symmetric air entry  Heart - normal rate, regular rhythm, normal S1, S2, no murmurs, rubs, clicks or gallops  Abdomen - soft, nontender, nondistended, no masses or organomegaly  Neurological - alert, oriented, normal speech, no focal findings or movement disorder noted  Musculoskeletal - no joint tenderness, deformity or swelling  Extremities - peripheral pulses normal, no pedal edema, no clubbing or cyanosis  Skin - normal coloration and turgor, no rashes, no suspicious skin lesions noted     Review of Diagnostic data:   Lab Results   Component Value Date    WBC 8.3 03/01/2021    HGB 14.0 03/01/2021    HCT 43.8 03/01/2021    MCV 75.5 (L) 03/01/2021     03/01/2021       Chemistry        Component Value Date/Time     01/29/2021 1412    K 4.5 01/29/2021 1412     01/29/2021 1412    CO2 21 01/29/2021 1412    BUN 21 (H) 01/29/2021 1412    CREATININE 0.77 01/29/2021 1412        Component Value Date/Time    CALCIUM 9.1 01/29/2021 1412    ALKPHOS 100 01/29/2021 1412    AST 26 01/29/2021 1412    ALT 22 01/29/2021 1412    BILITOT 0.42 01/29/2021 1412            IMPRESSION:   Erythrocytosis  Microcytosis  Overweight  Hypertension  Probable sleep apnea      PLAN: I reviewed the labs available to me and discussed with the patient. For more than 60 minutes of face to face discussion, I explained to the patient the nature of this hematologic problem. I explained the significance of these abnormalities in layman language. Patient's labs over the last 5 years showed persistent elevation of RBCs count and slight elevation of hematocrit. In addition she was noted to have microcytosis. Is all asymptomatic. Patient is not symptomatic. Very likely dealing with reactive erythrocytosis. This is possibly related to underlying sleep apnea. I expect patient is having element of iron deficiency as well which is the reason for the microcytosis and possibly maintaining hemoglobin within normal range. Further work-up will be done to look at that possibility and to rule out primary polycythemia. Explained to the patient and she understands and she agrees. If secondary polycythemia is confirmed, patient will need to have further evaluation for possible underlying sleep apnea. We will contact the patient with the results of the labs and further recommendations. Patient's questions were answered to the best of her satisfaction and she verbalized full understanding and agreement. If you have questions, please do not hesitate to call me. I look forward to following Aguilar along with you. Sincerely,                            [address] [facility], [place], operated by [facility]                            This note is created with the assistance of a speech recognition program.  While intending to generate a document that actually reflects the content of the visit, the document can still have some errors including those of syntax and sound a like substitutions which may escape proof reading. It such instances, actual meaning can be extrapolated by contextual diversion.

## 2021-03-03 LAB — ERYTHROPOIETIN: 17 MU/ML (ref 4–27)

## 2021-03-08 LAB — V617F MUTATION, QNT: 0 %

## 2021-03-15 ENCOUNTER — OFFICE VISIT (OUTPATIENT)
Dept: ONCOLOGY | Age: 51
End: 2021-03-15
Payer: COMMERCIAL

## 2021-03-15 ENCOUNTER — TELEPHONE (OUTPATIENT)
Dept: ONCOLOGY | Age: 51
End: 2021-03-15

## 2021-03-15 VITALS
SYSTOLIC BLOOD PRESSURE: 145 MMHG | DIASTOLIC BLOOD PRESSURE: 85 MMHG | TEMPERATURE: 98.1 F | BODY MASS INDEX: 44.35 KG/M2 | RESPIRATION RATE: 18 BRPM | HEART RATE: 73 BPM | WEIGHT: 234.6 LBS

## 2021-03-15 DIAGNOSIS — D75.1 ERYTHROCYTOSIS: ICD-10-CM

## 2021-03-15 DIAGNOSIS — G47.30 SLEEP APNEA, UNSPECIFIED TYPE: Primary | ICD-10-CM

## 2021-03-15 PROCEDURE — 99214 OFFICE O/P EST MOD 30 MIN: CPT | Performed by: INTERNAL MEDICINE

## 2021-03-15 PROCEDURE — G8417 CALC BMI ABV UP PARAM F/U: HCPCS | Performed by: INTERNAL MEDICINE

## 2021-03-15 PROCEDURE — 1036F TOBACCO NON-USER: CPT | Performed by: INTERNAL MEDICINE

## 2021-03-15 PROCEDURE — G8427 DOCREV CUR MEDS BY ELIG CLIN: HCPCS | Performed by: INTERNAL MEDICINE

## 2021-03-15 PROCEDURE — 99211 OFF/OP EST MAY X REQ PHY/QHP: CPT | Performed by: INTERNAL MEDICINE

## 2021-03-15 PROCEDURE — 3017F COLORECTAL CA SCREEN DOC REV: CPT | Performed by: INTERNAL MEDICINE

## 2021-03-15 PROCEDURE — G8484 FLU IMMUNIZE NO ADMIN: HCPCS | Performed by: INTERNAL MEDICINE

## 2021-03-15 NOTE — TELEPHONE ENCOUNTER
AVS from 3/15/21     Refer to pulmonary Nasreen Bai  RV PRN    Referral faxed to pulmonology, dr Jeevan Mccarty (fax [phone]) confirmation rcv'd.  They will call pt    Pt was given AVS

## 2021-03-15 NOTE — PROGRESS NOTES
_  Chief Complaint   Patient presents with    Follow-up     review status of disease       DIAGNOSIS:        Erythrocytosis, reactive, likely related to sleep apnea. Microcytosis. Mild iron deficiency. Overweight  Hypertension  Probable sleep apnea     CURRENT THERAPY:         Patient seen discussed results of the lab tests and further management  BRIEF CASE HISTORY:      Ms. Jose White is a very pleasant 45 y.o. female with history of multiple co morbidities as listed. Patient is referred for further management of erythrocytosis. Patient was noted to have elevation of red blood cells in several locations. She was referred for further regimen. Patient has no symptoms related to this problem. She denies any chest pain. No headaches. No dizziness. No TIA or CVA-like symptoms. Patient denies any fever or night sweats. No weight loss or decreased appetite. No enlarged lymph nodes. The patient is non-smoker. She snores at night. She has hypertension. No smoking or alcohol drinking. .     INTERIM HISTORY:   Patient is seen for follow-up after work-up for erythrocytosis. Patient denies any headaches. No dizziness. No chest pain. No fever or infections. No other complaints. PAST MEDICAL HISTORY: has a past medical history of Anxiety, Cervical ca ([address] Hyperlipidemia, and Hypertension. PAST SURGICAL HISTORY: has a past surgical history that includes Tubal ligation; Cholecystectomy; Skin graft (Right); frandy and bso (cervix removed) (10/05/2016); Hysterectomy; Colonoscopy; and Colonoscopy (N/A, 11/11/2019). CURRENT MEDICATIONS:  has a current medication list which includes the following prescription(s): ibuprofen, vitamin d, diclofenac, escitalopram, amlodipine, lisinopril, chlorzoxazone, and gabapentin. ALLERGIES:  has No Known Allergies.     FAMILY HISTORY: Negative for any hematological or currently breastfeeding. General appearance - well appearing, not in pain or distress  Mental status - good mood, alert and oriented  Eyes - pupils equal and reactive, extraocular eye movements intact  Ears - bilateral TM's and external ear canals normal  Nose - normal and patent, no erythema, discharge or polyps  Mouth - mucous membranes moist, pharynx normal without lesions  Neck - supple, no significant adenopathy  Lymphatics - no palpable lymphadenopathy, no hepatosplenomegaly  Chest - clear to auscultation, no wheezes, rales or rhonchi, symmetric air entry  Heart - normal rate, regular rhythm, normal S1, S2, no murmurs, rubs, clicks or gallops  Abdomen - soft, nontender, nondistended, no masses or organomegaly  Neurological - alert, oriented, normal speech, no focal findings or movement disorder noted  Musculoskeletal - no joint tenderness, deformity or swelling  Extremities - peripheral pulses normal, no pedal edema, no clubbing or cyanosis  Skin - normal coloration and turgor, no rashes, no suspicious skin lesions noted     Review of Diagnostic data:   Lab Results   Component Value Date    WBC 8.3 03/01/2021    HGB 14.0 03/01/2021    HCT 43.8 03/01/2021    MCV 75.5 (L) 03/01/2021     03/01/2021       Chemistry        Component Value Date/Time     01/29/2021 1412    K 4.5 01/29/2021 1412     01/29/2021 1412    CO2 21 01/29/2021 1412    BUN 21 (H) 01/29/2021 1412    CREATININE 0.77 01/29/2021 1412        Component Value Date/Time    CALCIUM 9.1 01/29/2021 1412    ALKPHOS 100 01/29/2021 1412    AST 26 01/29/2021 1412    ALT 22 01/29/2021 1412    BILITOT 0.42 01/29/2021 1412        JAK2 gene mutation normal  Erythropoietin normal  Carboxyhemoglobin level normal    IMPRESSION:   Erythrocytosis, reactive, likely related to sleep apnea. Microcytosis. Mild iron deficiency. Overweight  Hypertension  Probable sleep apnea      PLAN: I reviewed the labs as above and discussed with the patient.  I explained to the patient the nature of this hematologic problem. I explained the significance of these abnormalities in layman language. Patient's labs over the last 5 years showed persistent elevation of RBCs count and slight elevation of hematocrit. In addition she was noted to have microcytosis. Is all asymptomatic. Patient is not symptomatic. Very likely dealing with reactive erythrocytosis. Labs ruled out polycythemia vera. JAK2 gene mutation and erythropoietin are normal.  This is possibly related to underlying sleep apnea. So patient will need to have further evaluation for possible underlying sleep apnea. Will be referred to pulmonary. Patient has mild iron deficiency which will not be corrected at the present time noted to avoid any further erythrocytosis. Patient will have follow-up labs with her PCP but we will see her as needed. Patient's questions were answered to the best of her satisfaction and she verbalized full understanding and agreement.

## 2021-08-24 ENCOUNTER — HOSPITAL ENCOUNTER (OUTPATIENT)
Dept: SLEEP CENTER | Age: 51
Discharge: HOME | End: 2021-08-26
Payer: COMMERCIAL

## 2021-08-24 DIAGNOSIS — G47.33 OSA (OBSTRUCTIVE SLEEP APNEA): Primary | ICD-10-CM

## 2021-08-24 PROCEDURE — 95810 POLYSOM 6/> YRS 4/> PARAM: CPT

## 2021-08-24 NOTE — PAYOR INFORMATION
Verified Demographics with Patient? No   If no why? Already verified  [facility] Completed? No    If not why? Already completed  Verified Insurance through: Already verified  [employer] Completed? Not required  Auth Verification #:[id number]  Liability Due: $503.00   Discount Offered: na%       Previous Balance: $ 0   Discount Offered: na%  Site Collect Status: pt refused- prefers to be billed  Patient Response: pt refused- was not prepared to make a payment- prefers to be billed  Financial Aid Offered?  Yes Pt declined  Cards Scanned: yes

## 2021-08-25 VITALS
HEIGHT: 61 IN | WEIGHT: 236 LBS | BODY MASS INDEX: 44.56 KG/M2 | HEART RATE: 67 BPM | RESPIRATION RATE: 16 BRPM | OXYGEN SATURATION: 93 %

## 2021-08-25 ASSESSMENT — SLEEP AND FATIGUE QUESTIONNAIRES
HOW LIKELY ARE YOU TO NOD OFF OR FALL ASLEEP WHEN YOU ARE A PASSENGER IN A CAR FOR AN HOUR WITHOUT A BREAK: 3
HOW LIKELY ARE YOU TO NOD OFF OR FALL ASLEEP IN A CAR, WHILE STOPPED FOR A FEW MINUTES IN TRAFFIC: 0
HOW LIKELY ARE YOU TO NOD OFF OR FALL ASLEEP WHILE SITTING INACTIVE IN A PUBLIC PLACE: 2
HOW LIKELY ARE YOU TO NOD OFF OR FALL ASLEEP WHILE WATCHING TV: 3
HOW LIKELY ARE YOU TO NOD OFF OR FALL ASLEEP WHILE SITTING AND READING: 2
HOW LIKELY ARE YOU TO NOD OFF OR FALL ASLEEP WHILE SITTING AND TALKING TO SOMEONE: 1
HOW LIKELY ARE YOU TO NOD OFF OR FALL ASLEEP WHILE SITTING QUIETLY AFTER LUNCH WITHOUT ALCOHOL: 1
ESS TOTAL SCORE: 14
HOW LIKELY ARE YOU TO NOD OFF OR FALL ASLEEP WHILE LYING DOWN TO REST IN THE AFTERNOON WHEN CIRCUMSTANCES PERMIT: 2

## 2021-09-19 LAB — STATUS: NORMAL

## 2021-10-08 ENCOUNTER — HOSPITAL ENCOUNTER (OUTPATIENT)
Age: 51
Setting detail: SPECIMEN
Discharge: HOME | End: 2021-10-08
Payer: COMMERCIAL

## 2021-10-08 ENCOUNTER — OFFICE VISIT (OUTPATIENT)
Dept: OBGYN CLINIC | Age: 51
End: 2021-10-08
Payer: COMMERCIAL

## 2021-10-08 VITALS
BODY MASS INDEX: 45.94 KG/M2 | DIASTOLIC BLOOD PRESSURE: 78 MMHG | HEIGHT: 60 IN | WEIGHT: 234 LBS | HEART RATE: 92 BPM | SYSTOLIC BLOOD PRESSURE: 124 MMHG

## 2021-10-08 DIAGNOSIS — Z01.419 WOMEN'S ANNUAL ROUTINE GYNECOLOGICAL EXAMINATION: Primary | ICD-10-CM

## 2021-10-08 DIAGNOSIS — Z13.29 THYROID DISORDER SCREENING: ICD-10-CM

## 2021-10-08 DIAGNOSIS — E55.9 VITAMIN D DEFICIENCY: ICD-10-CM

## 2021-10-08 DIAGNOSIS — F41.9 ANXIETY: ICD-10-CM

## 2021-10-08 DIAGNOSIS — E78.2 MIXED HYPERLIPIDEMIA: ICD-10-CM

## 2021-10-08 DIAGNOSIS — Z12.12 SCREENING FOR COLORECTAL CANCER: ICD-10-CM

## 2021-10-08 DIAGNOSIS — R73.03 PRE-DIABETES: ICD-10-CM

## 2021-10-08 DIAGNOSIS — Z12.31 ENCOUNTER FOR SCREENING MAMMOGRAM FOR MALIGNANT NEOPLASM OF BREAST: ICD-10-CM

## 2021-10-08 DIAGNOSIS — E66.01 CLASS 3 SEVERE OBESITY DUE TO EXCESS CALORIES WITH SERIOUS COMORBIDITY AND BODY MASS INDEX (BMI) OF 40.0 TO 44.9 IN ADULT (HCC): ICD-10-CM

## 2021-10-08 DIAGNOSIS — Z12.11 SCREENING FOR COLORECTAL CANCER: ICD-10-CM

## 2021-10-08 DIAGNOSIS — I10 ESSENTIAL HYPERTENSION: ICD-10-CM

## 2021-10-08 DIAGNOSIS — Z11.59 ENCOUNTER FOR HEPATITIS C SCREENING TEST FOR LOW RISK PATIENT: ICD-10-CM

## 2021-10-08 LAB
ABSOLUTE EOS #: 0.29 K/UL (ref 0–0.44)
ABSOLUTE IMMATURE GRANULOCYTE: 0.04 K/UL (ref 0–0.3)
ABSOLUTE LYMPH #: 2.14 K/UL (ref 1.1–3.7)
ABSOLUTE MONO #: 0.47 K/UL (ref 0.1–1.2)
ALBUMIN SERPL-MCNC: 4.1 G/DL (ref 3.5–5.2)
ALBUMIN/GLOBULIN RATIO: 1.3 (ref 1–2.5)
ALP BLD-CCNC: 105 U/L (ref 35–104)
ALT SERPL-CCNC: 18 U/L (ref 5–33)
ANION GAP SERPL CALCULATED.3IONS-SCNC: 15 MMOL/L (ref 9–17)
AST SERPL-CCNC: 19 U/L
BASOPHILS # BLD: 1 % (ref 0–2)
BASOPHILS ABSOLUTE: 0.06 K/UL (ref 0–0.2)
BILIRUB SERPL-MCNC: 0.37 MG/DL (ref 0.3–1.2)
BUN BLDV-MCNC: 15 MG/DL (ref 6–20)
BUN/CREAT BLD: ABNORMAL (ref 9–20)
CALCIUM SERPL-MCNC: 8.8 MG/DL (ref 8.6–10.4)
CHLORIDE BLD-SCNC: 103 MMOL/L (ref 98–107)
CHOLESTEROL, FASTING: 194 MG/DL
CHOLESTEROL/HDL RATIO: 4
CO2: 20 MMOL/L (ref 20–31)
CREAT SERPL-MCNC: 0.68 MG/DL (ref 0.5–0.9)
DIFFERENTIAL TYPE: ABNORMAL
EOSINOPHILS RELATIVE PERCENT: 3 % (ref 1–4)
ESTIMATED AVERAGE GLUCOSE: 123 MG/DL
GFR AFRICAN AMERICAN: >60 ML/MIN
GFR NON-AFRICAN AMERICAN: >60 ML/MIN
GFR SERPL CREATININE-BSD FRML MDRD: ABNORMAL ML/MIN/{1.73_M2}
GFR SERPL CREATININE-BSD FRML MDRD: ABNORMAL ML/MIN/{1.73_M2}
GLUCOSE BLD-MCNC: 81 MG/DL (ref 70–99)
HBA1C MFR BLD: 5.9 % (ref 4–6)
HCT VFR BLD CALC: 47.9 % (ref 36.3–47.1)
HDLC SERPL-MCNC: 49 MG/DL
HEMOGLOBIN: 14.2 G/DL (ref 11.9–15.1)
HEPATITIS C ANTIBODY: NONREACTIVE
IMMATURE GRANULOCYTES: 1 %
LDL CHOLESTEROL: 130 MG/DL (ref 0–130)
LYMPHOCYTES # BLD: 25 % (ref 24–43)
MCH RBC QN AUTO: 24.6 PG (ref 25.2–33.5)
MCHC RBC AUTO-ENTMCNC: 29.6 G/DL (ref 28.4–34.8)
MCV RBC AUTO: 82.9 FL (ref 82.6–102.9)
MONOCYTES # BLD: 6 % (ref 3–12)
NRBC AUTOMATED: 0 PER 100 WBC
PDW BLD-RTO: 18 % (ref 11.8–14.4)
PLATELET # BLD: 374 K/UL (ref 138–453)
PLATELET ESTIMATE: ABNORMAL
PMV BLD AUTO: 10.3 FL (ref 8.1–13.5)
POTASSIUM SERPL-SCNC: 4.2 MMOL/L (ref 3.7–5.3)
RBC # BLD: 5.78 M/UL (ref 3.95–5.11)
RBC # BLD: ABNORMAL 10*6/UL
SEG NEUTROPHILS: 64 % (ref 36–65)
SEGMENTED NEUTROPHILS ABSOLUTE COUNT: 5.54 K/UL (ref 1.5–8.1)
SODIUM BLD-SCNC: 138 MMOL/L (ref 135–144)
TOTAL PROTEIN: 7.2 G/DL (ref 6.4–8.3)
TRIGLYCERIDE, FASTING: 77 MG/DL
TSH SERPL DL<=0.05 MIU/L-ACNC: 1.67 MIU/L (ref 0.3–5)
VITAMIN D 25-HYDROXY: 24.3 NG/ML (ref 30–100)
VLDLC SERPL CALC-MCNC: NORMAL MG/DL (ref 1–30)
WBC # BLD: 8.5 K/UL (ref 3.5–11.3)
WBC # BLD: ABNORMAL 10*3/UL

## 2021-10-08 PROCEDURE — 99396 PREV VISIT EST AGE 40-64: CPT | Performed by: NURSE PRACTITIONER

## 2021-10-08 PROCEDURE — G8484 FLU IMMUNIZE NO ADMIN: HCPCS | Performed by: NURSE PRACTITIONER

## 2021-10-08 RX ORDER — GABAPENTIN 100 MG/1
100 CAPSULE ORAL 3 TIMES DAILY
COMMUNITY
End: 2021-11-26

## 2021-10-08 RX ORDER — ESCITALOPRAM OXALATE 10 MG/1
10 TABLET ORAL DAILY
Qty: 90 TABLET | Refills: 4 | Status: SHIPPED | OUTPATIENT
Start: 2021-10-08 | End: 2022-10-11 | Stop reason: SDUPTHER

## 2021-10-08 RX ORDER — DICLOFENAC SODIUM 75 MG/1
75 TABLET, DELAYED RELEASE ORAL 2 TIMES DAILY
COMMUNITY
End: 2022-06-24

## 2021-10-08 ASSESSMENT — PATIENT HEALTH QUESTIONNAIRE - PHQ9
2. FEELING DOWN, DEPRESSED OR HOPELESS: 0
SUM OF ALL RESPONSES TO PHQ9 QUESTIONS 1 & 2: 0
SUM OF ALL RESPONSES TO PHQ QUESTIONS 1-9: 0
SUM OF ALL RESPONSES TO PHQ QUESTIONS 1-9: 0
1. LITTLE INTEREST OR PLEASURE IN DOING THINGS: 0
SUM OF ALL RESPONSES TO PHQ QUESTIONS 1-9: 0

## 2021-10-08 NOTE — PROGRESS NOTES
History and Physical  [address], [address] [facility] [address] [phone]   Fax [phone]  Elpidio Chadwick  10/8/2021              45 y.o. Chief Complaint   Patient presents with    Gynecologic Exam       Patient's last menstrual period was 2016. Primary Care Physician: Miya Oakes PA-C    The patient was seen and examined. She has no chief complaint today and is here for her annual exam.  Her bowels are regular. There are no voiding complaints. She denies any bloating. She denies vaginal discharge and was counseled on STD's and the need for barrier contraception. HPI : Elpidio Chadwick is a 45 y.o. female [facility]    Annual exam  Patient reports hx of cervical dysplasia years prior to hysterectomy while she was seeing Dr Caleb Lawrence. Concerned she may have had cervical cancer. No records available. Sister with same symptoms and now as masses on her bone and bladder. Requesting pap smear. Had hysterectomy [facility] BSO with DR Tra Canales 10/2016. Endometrial biopsy and cervical biopsy negative for dysplasia prior to hysterectomy. Negative pathology noted after hysterectomy. No PMB, cramping, pelvic pain.   Desires to continue on Lexapro- taking for hot flashes.      ________________________________________________________________________  OB History    Para Term  AB Living   0 0 0 0 0 0   SAB TAB Ectopic Molar Multiple Live Births   0 0 0 0 0 0     Past Medical History:   Diagnosis Date    Anxiety     [address] [address]     Hyperlipidemia     no meds    Hypertension                                                                    Past Surgical History:   Procedure Laterality Date    CHOLECYSTECTOMY      COLONOSCOPY      in her early 's    COLONOSCOPY N/A 2019    COLORECTAL CANCER SCREENING, HIGH RISK performed by Zora Morocho MD at [place] [id number] Right     rt middle finger  MIGUEL AND BSO  10/05/2016    TUBAL LIGATION       Family History   Problem Relation Age of Onset    High Blood Pressure Mother     Diabetes Mother     Other Mother         coronary thrombosis    Early Death Mother     Depression Mother     Diabetes Father     High Cholesterol Father     High Blood Pressure Father     Stroke Father     Diabetes Sister     High Blood Pressure Sister     High Blood Pressure Brother     Depression Maternal Grandmother     Cancer Maternal Grandfather         leukemia    Heart Disease Maternal Grandfather         MI    High Blood Pressure Sister     Mental Illness Brother         anxiety     Social History     Socioeconomic History    Marital status: Single     Spouse name: Not on file    Number of children: Not on file    Years of education: Not on file    Highest education level: Not on file   Occupational History    Not on file   Tobacco Use    Smoking status: Never Smoker    Smokeless tobacco: Never Used   Vaping Use    Vaping Use: Never used   Substance and Sexual Activity    Alcohol use: No     Alcohol/week: 0.0 standard drinks    Drug use: No    Sexual activity: Not on file   Other Topics Concern    Not on file   Social History Narrative    Not on file     Social Determinants of Health     Financial Resource Strain: Low Risk     Difficulty of Paying Living Expenses: Not hard at all   Food Insecurity: No Food Insecurity    Worried About Running Out of Food in the Last Year: Never true    Siobhan of Food in the Last Year: Never true   Transportation Needs:     Lack of Transportation (Medical):      Lack of Transportation (Non-Medical):    Physical Activity:     Days of Exercise per Week:     Minutes of Exercise per Session:    Stress:     Feeling of Stress :    Social Connections:     Frequency of Communication with Friends and Family:     Frequency of Social Gatherings with Friends and Family:     Attends Hinduism Services:     Active Member of Clubs or Organizations:     Attends Club or Organization Meetings:     Marital Status:    Intimate Partner Violence:     Fear of Current or Ex-Partner:     Emotionally Abused:     Physically Abused:     Sexually Abused:        MEDICATIONS:  Current Outpatient Medications   Medication Sig Dispense Refill    gabapentin (NEURONTIN) 100 MG capsule Take 100 mg by mouth 3 times daily.  diclofenac (VOLTAREN) 75 MG EC tablet Take 75 mg by mouth 2 times daily      lisinopril (PRINIVIL;ZESTRIL) 10 MG tablet Take 1 tablet by mouth once daily 90 tablet 2    chlorzoxazone (PARAFON FORTE) 500 MG tablet Take 1 tablet by mouth three times daily as needed for muscle spasm 90 tablet 0    vitamin D (ERGOCALCIFEROL) 1.25 MG (58849 UT) CAPS capsule Take 1 capsule by mouth once a week 12 capsule 2    escitalopram (LEXAPRO) 10 MG tablet Take 1 tablet by mouth once daily 30 tablet 6    amLODIPine (NORVASC) 10 MG tablet Take 1 tablet by mouth once daily (Patient not taking: Reported on 10/8/2021) 90 tablet 2    ibuprofen (ADVIL;MOTRIN) 800 MG tablet Take 800 mg by mouth every 6 hours as needed for Pain (Patient not taking: Reported on 10/8/2021)       No current facility-administered medications for this visit. ALLERGIES:  Allergies as of 10/08/2021    (No Known Allergies)       Symptoms of decreased mood absent  Symptoms of anhedonia absent    **If either question is answered in a  positive fashion then complete the PHQ9 Scoring Evaluation and make the appropriate referral**      Immunization status: stated as current, but no records available. Gynecologic History:  Menarche: 14 yo  Menopause at 55 yo     Patient's last menstrual period was 09/06/2016. Sexually Active: Yes    STD History: No     Permanent Sterilization: Yes hyst   Reversible Birth Control: No        Hormone Replacement Exposure: Yes no longer taking it.     Genetic Qualified Family History of Breast, Ovarian , Colon or Uterine Cancer: No     If YES see scanned worksheet. Preventative Health Testing:    Health Maintenance:  Health Maintenance Due   Topic Date Due    Hepatitis C screen  Never done    Shingles Vaccine (1 of 2) Never done    Breast cancer screen  09/13/2020    Flu vaccine (1) 09/01/2021       Date of Last Pap Smear: 3/2016 ASCUS  Abnormal Pap Smear History: yes, patient reports hx of abnormal pap smears prior to pap. Desires pap smear. Colposcopy History: 2016  Date of Last Mammogram: 9/2018 neg  Date of Last Colonoscopy: 11/2019  Date of Last Bone Density:      ________________________________________________________________________        REVIEW OF SYSTEMS:    yes   A minimum of an eleven point review of systems was completed. Review Of Systems (11 point):  Constitutional: No fever, chills or malaise; No weight change or fatigue  Head and Eyes: No vision changes, Headache, Dizziness or trauma in last 12 months  ENT ROS: No hearing, Tinnitis, sinus or taste problems  Hematological and Lymphatic ROS:No Lymphoma, Von Willebrand's, Hemophillia or Bleeding History  Psych ROS: No Depression, Homicidal thoughts,suicidal thoughts, or anxiety  Breast ROS: No breast abnormalities or lumps  Respiratory ROS: No SOB, Pneumoniae,Cough, or Pulmonary Embolism   Cardiovascular ROS: No Chest Pain with Exertion, Palpitations, Syncope, Edema, Arrhythmia. +Hypertension  Gastrointestinal ROS: No Indigestion, Heartburn, Nausea, vomiting, Diarrhea, Constipation,or Bowel Changes; No Bloody Stools or melena  Genito-Urinary ROS: No Dysuria, Hematuria or Nocturia.  No Urinary Incontinence or Vaginal Discharge  Musculoskeletal ROS: No Arthralgia, Arthritis,Gout,Osteoporosis or Rheumatism  Neurological ROS: No CVA, Migraines, Epilepsy, Seizure Hx, or Limb Weakness  Dermatological ROS: No Rash, Itching, Hives, Mole Changes or Cancer PHYSICAL Exam:     Constitutional:  Vitals:    10/08/21 0812   BP: 124/78   Site: Right Upper Arm   Position: Sitting   Cuff Size: Medium Adult   Pulse: 92   Weight: 234 lb (106.1 kg)   Height: 5' (1.524 m)       Chaperone for Intimate Exam   Chaperone was offered and accepted as part of the rooming process.  Chaperone: Fidelia          General Appearance: This  is a well Developed, well Nourished, well groomed female. Her BMI was reviewed. Nutritional decision making was discussed. Skin:  There was a Normal Inspection of the skin without rashes or lesions. There were no rashes. (Papular, Maculopapular, Hives, Pustular, Macular)     There were no lesions (Ulcers, Erythema, Abn. Appearing Nevi)            Lymphatic:  No Lymph Nodes were Palpable in the neck , axilla or groin.  0 # Of Lymph Nodes; Location ; Character [Normal]  [Shotty] [Tender] [Enlarged]     Neck and EENT:  The neck was supple. There were no masses   The thyroid was not enlarged and had no masses. Perrla, EOMI B/L, TMI B/L No Abnormalities. Throat inspected-No exudates or Masses, Nares Patent No Masses        Respiratory: The lungs were auscultated and found to be clear. There were no rales, rhonchi or wheezes. There was a good respiratory effort. Cardiovascular: The heart was in a regular rate and rhythm. . No S3 or S4. There was no murmur appreciated. Location, grade, and radiation are not applicable. Extremities: The patients extremities were without calf tenderness, edema, or varicosities. There was full range of motion in all four extremities. Pulses in all four extremities were appreciated and are 2/4. Abdomen: The abdomen was soft and non-tender. There were good bowel sounds in all quadrants and there was no guarding, rebound or rigidity.   On evaluation there was no evidence of hepatosplenomegaly and there was no costal vertebral xavier tenderness bilaterally. No hernias were appreciated. Abdominal Scars: hyst scar    Psych: The patient had a normal Orientation to: Time, Place, Person, and Situation  There is no Mood / Affect changes    Breast:  (Chest)  normal appearance, no masses or tenderness  Self breast exams were reviewed in detail. Literature was given. Pelvic Exam:  Vulva and vagina appear normal, bimanual exam reveals normal cuff intact. Rectal Exam:  exam declined by patient          Musculosk:  Normal Gait and station was noted. Digits were evaluated without abnormal findings. Range of motion, stability and strength were evaluated and found to be appropriate for the patients age. ASSESSMENT:      45 y.o. Annual   Diagnosis Orders   1. Women's annual routine gynecological examination     2. Screening for colorectal cancer            Chief Complaint   Patient presents with    Gynecologic Exam          Past Medical History:   Diagnosis Date    Anxiety     Cervical ca ([address]     Hyperlipidemia     no meds    Hypertension          Patient Active Problem List    Diagnosis Date Noted    10/5/16 TLH, BSO 10/05/2016     Priority: High     Fibroids/ heavy menses- cervical dysplasia noted prior to hysterectomy      HTN 10/05/2016     Priority: High    Hyperlipemia 10/05/2016     Priority: Medium    History of tubal ligation 10/05/2016     Priority: Medium    History of cholecystectomy 10/05/2016     Priority: Medium    History of abnormal cervical Pap smear 10/05/2016     Priority: Medium    Hot flashes 02/01/2019    Anxiety 03/08/2016          Hereditary Breast, Ovarian, Colon and Uterine Cancer screening Done. Tobacco & Secondary smoke risks reviewed; instructed on cessation and avoidance      Counseling Completed:  Preventative Health Recommendations and Follow up. The patient was informed of the recommended preventative health recommendations.     1. Annuals every

## 2021-10-12 LAB
HPV SAMPLE: ABNORMAL
HPV, GENOTYPE 16: NOT DETECTED
HPV, GENOTYPE 18: DETECTED
HPV, HIGH RISK OTHER: NOT DETECTED
HPV, INTERPRETATION: ABNORMAL
SPECIMEN DESCRIPTION: ABNORMAL

## 2021-10-15 LAB — CYTOLOGY REPORT: NORMAL

## 2021-10-18 ENCOUNTER — TELEPHONE (OUTPATIENT)
Dept: OBGYN CLINIC | Age: 51
End: 2021-10-18

## 2021-10-18 RX ORDER — METRONIDAZOLE 500 MG/1
500 TABLET ORAL 2 TIMES DAILY
Qty: 14 TABLET | Refills: 0 | Status: SHIPPED | OUTPATIENT
Start: 2021-10-18 | End: 2021-10-25

## 2021-10-18 NOTE — TELEPHONE ENCOUNTER
Per Diamond Mendoza pt notified of +HPV 18 on negative pap results. Pt information given to Neida to schedule patient for Colposcopy with Dr Gwen Hillman. Pt with +BV and pt to get flagyl 500mg sent to pt pharm.

## 2021-10-18 NOTE — TELEPHONE ENCOUNTER
----- Message from HUSAM Pa CNP sent at 10/18/2021  7:49 AM EDT -----  Pap smear neg  +HPV 18   AGE 51  Needs colposcopy scheduled  +BV- flagyl 500mg PO BID X7 days

## 2021-11-03 PROBLEM — R87.811 VAGINAL HIGH RISK HUMAN PAPILLOMAVIRUS (HPV) DNA TEST POSITIVE: Status: ACTIVE | Noted: 2021-11-03

## 2021-11-04 ENCOUNTER — PROCEDURE VISIT (OUTPATIENT)
Dept: OBGYN CLINIC | Age: 51
End: 2021-11-04
Payer: COMMERCIAL

## 2021-11-04 VITALS
HEIGHT: 61 IN | SYSTOLIC BLOOD PRESSURE: 104 MMHG | DIASTOLIC BLOOD PRESSURE: 74 MMHG | BODY MASS INDEX: 43.05 KG/M2 | WEIGHT: 228 LBS

## 2021-11-04 DIAGNOSIS — Z90.710 S/P LAPAROSCOPIC HYSTERECTOMY: ICD-10-CM

## 2021-11-04 DIAGNOSIS — R87.811 VAGINAL HIGH RISK HUMAN PAPILLOMAVIRUS (HPV) DNA TEST POSITIVE: Primary | ICD-10-CM

## 2021-11-04 PROCEDURE — 57420 EXAM OF VAGINA W/SCOPE: CPT | Performed by: OBSTETRICS & GYNECOLOGY

## 2021-11-04 NOTE — PROGRESS NOTES
VIA [facility] Obstetrics & Gynecology    COLPOSCOPY PROCEDURE FORM    Chief Complaint:   Chief Complaint   Patient presents with    Procedure         11/4/2021  Irma Sorto  Patient's last menstrual period was 09/06/2016.  45 y.o.  Alyx Penn    Past Medical History:   Diagnosis Date    Anxiety     Cervical ca ([address]     Hyperlipidemia     no meds    Hypertension          Past Surgical History:   Procedure Laterality Date    CHOLECYSTECTOMY      COLONOSCOPY      in her early 28's    COLONOSCOPY N/A 11/11/2019    COLORECTAL CANCER SCREENING, HIGH RISK performed by Homer Chou MD at [place] [address] Right     rt middle finger    MIGUEL AND BSO  10/05/2016    TUBAL LIGATION         Family History   Problem Relation Age of Onset    High Blood Pressure Mother     Diabetes Mother     Other Mother         coronary thrombosis    Early Death Mother     Depression Mother     Diabetes Father     High Cholesterol Father     High Blood Pressure Father     Stroke Father     Diabetes Sister     High Blood Pressure Sister     High Blood Pressure Brother     Depression Maternal Grandmother     Cancer Maternal Grandfather         leukemia    Heart Disease Maternal Grandfather         MI    High Blood Pressure Sister     Mental Illness Brother         anxiety       Social History     Socioeconomic History    Marital status: Single     Spouse name: Not on file    Number of children: Not on file    Years of education: Not on file    Highest education level: Not on file   Occupational History    Not on file   Tobacco Use    Smoking status: Never Smoker    Smokeless tobacco: Never Used   Vaping Use    Vaping Use: Never used   Substance and Sexual Activity    Alcohol use: No     Alcohol/week: 0.0 standard drinks    Drug use: No    Sexual activity: Not on file   Other Topics Concern    Not on file   Social History Narrative    Not on file     Social Determinants of Health     Financial Resource Strain: Low Risk     Difficulty of Paying Living Expenses: Not hard at all   Food Insecurity: No Food Insecurity    Worried About Running Out of Food in the Last Year: Never true    Siobhan of Food in the Last Year: Never true   Transportation Needs:     Lack of Transportation (Medical):  Lack of Transportation (Non-Medical):    Physical Activity:     Days of Exercise per Week:     Minutes of Exercise per Session:    Stress:     Feeling of Stress :    Social Connections:     Frequency of Communication with Friends and Family:     Frequency of Social Gatherings with Friends and Family:     Attends Buddhist Services:     Active Member of Clubs or Organizations:     Attends Club or Organization Meetings:     Marital Status:    Intimate Partner Violence:     Fear of Current or Ex-Partner:     Emotionally Abused:     Physically Abused:     Sexually Abused:        Current Outpatient Medications on File Prior to Visit   Medication Sig Dispense Refill    gabapentin (NEURONTIN) 100 MG capsule Take 100 mg by mouth 3 times daily.  diclofenac (VOLTAREN) 75 MG EC tablet Take 75 mg by mouth 2 times daily      escitalopram (LEXAPRO) 10 MG tablet Take 1 tablet by mouth daily 90 tablet 4    lisinopril (PRINIVIL;ZESTRIL) 10 MG tablet Take 1 tablet by mouth once daily 90 tablet 2    chlorzoxazone (PARAFON FORTE) 500 MG tablet Take 1 tablet by mouth three times daily as needed for muscle spasm 90 tablet 0    vitamin D (ERGOCALCIFEROL) 1.25 MG (32862 UT) CAPS capsule Take 1 capsule by mouth once a week 12 capsule 2    amLODIPine (NORVASC) 10 MG tablet Take 1 tablet by mouth once daily (Patient not taking: Reported on 10/8/2021) 90 tablet 2    ibuprofen (ADVIL;MOTRIN) 800 MG tablet Take 800 mg by mouth every 6 hours as needed for Pain (Patient not taking: Reported on 10/8/2021)       No current facility-administered medications on file prior to visit.        Allergies as of 11/04/2021    (No Known Allergies)           INDICATIONS:   1. Vaginal high risk human papillomavirus (HPV) DNA test positive    2. 10[facility]5/16 [facility]                   [facility]         HPV:   positive  (18)    Birth Control Method: hyst  Abnormal Cytology and Colposcopy History: none    COLPOSCOPIC EXAMINATION:                Blood pressure 104/74, height 5' 1\" (1.549 m), weight 228 lb (103.4 kg), last menstrual period 09/06/2016, not currently breastfeeding. Chaperone for Intimate Exam   Chaperone was offered and accepted as part of the rooming process.  Chaperone: Fely GARRETT time out was called by the Chaperone listed above while ALL participants were quiet and attentive. The procedure to be completed was confirmed, with the appropriate laterality demarcated prior, if appropriate, as well as the patients name and a unique identifier, her date of birth. All questions were answered to her satisfaction. The consent was signed prior to the time out and all the risks were discussed in detail. Gross observations: negative  Satisfactory: Yes   Unsatisfactory: No  Entire cuff visualized without abnormalities        LANDMARKS and ATYPICAL FINDINGS:  TZ = transformation zone   SC = new squamocolumnar junction  NC = Nabothian cyst    ME = immature squamous metaplasia  PO = polyp   AV = atypical vessels  C = condyloma  L = leukoplakia  AW = acetowhite epithelium   P = punctation  MO = mosaicism   LS = decreased Lugols uptake  X = biopsy sites  CA = invasive carcinoma      FINDINGS: The colposcope was utilized on high and low magnification. A plain white light and green filter were  also implemented after 3% of acetic acid was applied to the vagina. There was no Aceto White Epithelium, Mosaic Changes, Punctation, or Irregular Vessels seen. ECC performed:  na    Lugols Iodine Applied:   No       IMPRESSIONS: negative  Biopsy sites: none      Assessment:   Diagnosis Orders   1.  Vaginal high risk human papillomavirus (HPV) DNA test positive  MD COLPOSCOPY,ENTIRE VAGINA   2. 10/5/16 [facility], BSO           Patient Active Problem List    Diagnosis Date Noted    Vaginal high risk human papillomavirus (HPV) DNA test positive (18) 11/03/2021     Priority: High    10/5/16 [facility], BSO 10/05/2016     Priority: High     Overview Note:     Fibroids/ heavy menses- cervical dysplasia LSIL only noted prior to hysterectomy-NO conization hx       Assessment & Plan Note:            HTN 10/05/2016     Priority: High    Hyperlipemia 10/05/2016     Priority: Medium    History of tubal ligation 10/05/2016     Priority: Medium    History of cholecystectomy 10/05/2016     Priority: Medium    History of abnormal cervical Pap smear 10/05/2016     Priority: Medium    Hot flashes 02/01/2019    Anxiety 03/08/2016           PLAN:   1. The patient was given formal restriction guidelines. She is to RTO in 2 weeks. FOR PATIENTS WHO UNDERWENT A BIOPSY-They were instructed to report         any increase in vaginal bleeding, discharge, or any temperature more than      100.4   F. Strict pelvic rest precautions were reviewed with lifting restrictions. 2. If WNL Histopathology or Negative Colposcopic evaluation without Biopsies           and/or ECC then Cytologic Collection/PAP in 12 months.     3. HPV Barrier Recommendations and STD counseling completed      Herberth Cunha DO

## 2022-04-08 ENCOUNTER — HOSPITAL ENCOUNTER (OUTPATIENT)
Age: 52
Setting detail: SPECIMEN
Discharge: HOME | End: 2022-04-08

## 2022-04-08 DIAGNOSIS — R73.03 PRE-DIABETES: ICD-10-CM

## 2022-04-08 DIAGNOSIS — E55.9 VITAMIN D DEFICIENCY: ICD-10-CM

## 2022-04-08 DIAGNOSIS — I10 ESSENTIAL HYPERTENSION: ICD-10-CM

## 2022-04-08 DIAGNOSIS — E78.2 MIXED HYPERLIPIDEMIA: ICD-10-CM

## 2022-04-08 DIAGNOSIS — F41.9 ANXIETY: ICD-10-CM

## 2022-04-08 LAB
ABSOLUTE EOS #: 0.17 K/UL (ref 0–0.44)
ABSOLUTE IMMATURE GRANULOCYTE: 0.03 K/UL (ref 0–0.3)
ABSOLUTE LYMPH #: 2.33 K/UL (ref 1.1–3.7)
ABSOLUTE MONO #: 0.49 K/UL (ref 0.1–1.2)
ALBUMIN SERPL-MCNC: 4.1 G/DL (ref 3.5–5.2)
ALBUMIN/GLOBULIN RATIO: 1.2 (ref 1–2.5)
ALP BLD-CCNC: 107 U/L (ref 35–104)
ALT SERPL-CCNC: 14 U/L (ref 5–33)
ANION GAP SERPL CALCULATED.3IONS-SCNC: 16 MMOL/L (ref 9–17)
AST SERPL-CCNC: 23 U/L
BASOPHILS # BLD: 1 % (ref 0–2)
BASOPHILS ABSOLUTE: 0.09 K/UL (ref 0–0.2)
BILIRUB SERPL-MCNC: 0.37 MG/DL (ref 0.3–1.2)
BUN BLDV-MCNC: 17 MG/DL (ref 6–20)
CALCIUM SERPL-MCNC: 9.4 MG/DL (ref 8.6–10.4)
CHLORIDE BLD-SCNC: 101 MMOL/L (ref 98–107)
CHOLESTEROL/HDL RATIO: 4.8
CHOLESTEROL: 225 MG/DL
CO2: 24 MMOL/L (ref 20–31)
CREAT SERPL-MCNC: 0.76 MG/DL (ref 0.5–0.9)
EOSINOPHILS RELATIVE PERCENT: 2 % (ref 1–4)
GFR AFRICAN AMERICAN: >60 ML/MIN
GFR NON-AFRICAN AMERICAN: >60 ML/MIN
GFR SERPL CREATININE-BSD FRML MDRD: ABNORMAL ML/MIN/{1.73_M2}
GLUCOSE BLD-MCNC: 72 MG/DL (ref 70–99)
HCT VFR BLD CALC: 49.8 % (ref 36.3–47.1)
HDLC SERPL-MCNC: 47 MG/DL
HEMOGLOBIN: 14.3 G/DL (ref 11.9–15.1)
IMMATURE GRANULOCYTES: 0 %
LDL CHOLESTEROL: 158 MG/DL (ref 0–130)
LYMPHOCYTES # BLD: 27 % (ref 24–43)
MCH RBC QN AUTO: 24.6 PG (ref 25.2–33.5)
MCHC RBC AUTO-ENTMCNC: 28.7 G/DL (ref 28.4–34.8)
MCV RBC AUTO: 85.6 FL (ref 82.6–102.9)
MONOCYTES # BLD: 6 % (ref 3–12)
NRBC AUTOMATED: 0 PER 100 WBC
PDW BLD-RTO: 18.4 % (ref 11.8–14.4)
PLATELET # BLD: 393 K/UL (ref 138–453)
PMV BLD AUTO: 10.5 FL (ref 8.1–13.5)
POTASSIUM SERPL-SCNC: 5.3 MMOL/L (ref 3.7–5.3)
RBC # BLD: 5.82 M/UL (ref 3.95–5.11)
RBC # BLD: ABNORMAL 10*6/UL
SEG NEUTROPHILS: 64 % (ref 36–65)
SEGMENTED NEUTROPHILS ABSOLUTE COUNT: 5.55 K/UL (ref 1.5–8.1)
SODIUM BLD-SCNC: 141 MMOL/L (ref 135–144)
TOTAL PROTEIN: 7.6 G/DL (ref 6.4–8.3)
TRIGL SERPL-MCNC: 101 MG/DL
TSH SERPL DL<=0.05 MIU/L-ACNC: 1.93 UIU/ML (ref 0.3–5)
VITAMIN D 25-HYDROXY: 31.7 NG/ML
WBC # BLD: 8.7 K/UL (ref 3.5–11.3)

## 2022-04-10 LAB
ESTIMATED AVERAGE GLUCOSE: 126 MG/DL
HBA1C MFR BLD: 6 % (ref 4–6)

## 2022-10-07 ENCOUNTER — HOSPITAL ENCOUNTER (OUTPATIENT)
Dept: WOMENS IMAGING | Age: 52
Discharge: HOME | End: 2022-10-09
Payer: COMMERCIAL

## 2022-10-07 DIAGNOSIS — Z01.419 WOMEN'S ANNUAL ROUTINE GYNECOLOGICAL EXAMINATION: ICD-10-CM

## 2022-10-07 DIAGNOSIS — Z12.31 ENCOUNTER FOR SCREENING MAMMOGRAM FOR MALIGNANT NEOPLASM OF BREAST: ICD-10-CM

## 2022-10-07 PROCEDURE — 77063 BREAST TOMOSYNTHESIS BI: CPT

## 2022-10-11 ENCOUNTER — HOSPITAL ENCOUNTER (OUTPATIENT)
Age: 52
Setting detail: SPECIMEN
Discharge: HOME | End: 2022-10-11

## 2022-10-11 ENCOUNTER — OFFICE VISIT (OUTPATIENT)
Dept: OBGYN CLINIC | Age: 52
End: 2022-10-11
Payer: COMMERCIAL

## 2022-10-11 VITALS
BODY MASS INDEX: 44.97 KG/M2 | WEIGHT: 238.2 LBS | DIASTOLIC BLOOD PRESSURE: 80 MMHG | HEIGHT: 61 IN | SYSTOLIC BLOOD PRESSURE: 140 MMHG

## 2022-10-11 DIAGNOSIS — Z01.419 WELL WOMAN EXAM WITH ROUTINE GYNECOLOGICAL EXAM: Primary | ICD-10-CM

## 2022-10-11 DIAGNOSIS — Z11.51 SPECIAL SCREENING EXAMINATION FOR HUMAN PAPILLOMAVIRUS (HPV): ICD-10-CM

## 2022-10-11 DIAGNOSIS — Z86.19 HISTORY OF HPV INFECTION: ICD-10-CM

## 2022-10-11 PROCEDURE — 99396 PREV VISIT EST AGE 40-64: CPT | Performed by: NURSE PRACTITIONER

## 2022-10-11 PROCEDURE — G8484 FLU IMMUNIZE NO ADMIN: HCPCS | Performed by: NURSE PRACTITIONER

## 2022-10-11 RX ORDER — ESCITALOPRAM OXALATE 10 MG/1
10 TABLET ORAL DAILY
Qty: 90 TABLET | Refills: 4 | Status: SHIPPED | OUTPATIENT
Start: 2022-10-11 | End: 2023-01-09

## 2022-10-11 NOTE — PROGRESS NOTES
History and Physical  [address], [address] [facility] [address], [address] [facility] [phone]   Fax [phone]  Macel Denver  10/11/2022              47 y.o. Chief Complaint   Patient presents with    Gynecologic Exam     Last pap 10/11/2021 Neg +hpv18  21 colp neg  Jenna 10/7/2022 Neg  Colonoscopy 19        Patient's last menstrual period was 2016. Primary Care Physician: Clarisa Carter, APRN - CNP    The patient was seen and examined. She has no chief complaint today and is here for her annual exam.  Her bowels are regular. There are no voiding complaints. She denies any bloating. She denies vaginal discharge and was counseled on STD's and the need for barrier contraception. HPI : Macel Denver is a 47 y.o. female     Annual exam  Had hysterectomy [facility] BSO with DR Nancy Cedeño 10/2016. Endometrial biopsy and cervical biopsy negative for dysplasia prior to hysterectomy. Negative pathology noted after hysterectomy. Last colp 2021 negative Dr Mauricio Corrales. Lexapro for hot flashes with good relief.   Desires to continue      no Bloating  no Early Satiety  no Unexplained weight change of more than 15 lbs  no  PMB  no  PCB  ________________________________________________________________________  OB History    Para Term  AB Living   2 2 2 0 0 0   SAB IAB Ectopic Molar Multiple Live Births   0 0 0 0 0 0      # Outcome Date GA Lbr Pedro/2nd Weight Sex Delivery Anes PTL Lv   2 Term            1 Term              Past Medical History:   Diagnosis Date    Anxiety     Cervical ca ([address]     Hyperlipidemia     no meds    Hypertension                                                                    Past Surgical History:   Procedure Laterality Date    CHOLECYSTECTOMY      COLONOSCOPY      in her early 28's    COLONOSCOPY N/A 2019    COLORECTAL CANCER SCREENING, HIGH RISK performed by Dustin Haas MD at [place] [address] HYSTERECTOMY (CERVIX STATUS UNKNOWN)      SKIN GRAFT Right     rt middle finger    MIGUEL AND BSO (CERVIX REMOVED)  10/05/2016    TUBAL LIGATION       Family History   Problem Relation Age of Onset    High Blood Pressure Mother     Diabetes Mother     Other Mother         coronary thrombosis    Early Death Mother     Depression Mother     Diabetes Father     High Cholesterol Father     High Blood Pressure Father     Stroke Father     Diabetes Sister     High Blood Pressure Sister     High Blood Pressure Brother     Depression Maternal Grandmother     Cancer Maternal Grandfather         leukemia    Heart Disease Maternal Grandfather         MI    High Blood Pressure Sister     Mental Illness Brother         anxiety     Social History     Socioeconomic History    Marital status: Single     Spouse name: Not on file    Number of children: Not on file    Years of education: Not on file    Highest education level: Not on file   Occupational History    Not on file   Tobacco Use    Smoking status: Never    Smokeless tobacco: Never   Vaping Use    Vaping Use: Never used   Substance and Sexual Activity    Alcohol use: No     Alcohol/week: 0.0 standard drinks    Drug use: No    Sexual activity: Not on file   Other Topics Concern    Not on file   Social History Narrative    Not on file     Social Determinants of Health     Financial Resource Strain: Low Risk     Difficulty of Paying Living Expenses: Not hard at all   Food Insecurity: No Food Insecurity    Worried About Running Out of Food in the Last Year: Never true    Ran Out of Food in the Last Year: Never true   Transportation Needs: Not on file   Physical Activity: Not on file   Stress: Not on file   Social Connections: Not on file   Intimate Partner Violence: Not on file   Housing Stability: Not on file       MEDICATIONS:  Current Outpatient Medications   Medication Sig Dispense Refill    escitalopram (LEXAPRO) 10 MG tablet Take 1 tablet by mouth daily 90 tablet 4 chlorzoxazone (PARAFON FORTE) 500 MG tablet Take 1 tablet by mouth 3 times daily as needed for Muscle spasms 90 tablet 1    lisinopril (PRINIVIL;ZESTRIL) 10 MG tablet Take 1 tablet by mouth once daily 90 tablet 1    amLODIPine (NORVASC) 10 MG tablet Take 1 tablet by mouth daily 30 tablet 3    gabapentin (NEURONTIN) 100 MG capsule TAKE 1 CAPSULE BY MOUTH THREE TIMES DAILY 90 capsule 3    diclofenac (VOLTAREN) 75 MG EC tablet Take 1 tablet by mouth twice daily 60 tablet 2    atorvastatin (LIPITOR) 20 MG tablet Take 1 tablet by mouth daily 30 tablet 3    vitamin D (ERGOCALCIFEROL) 1.25 MG (07013 UT) CAPS capsule Take 1 capsule by mouth once a week 12 capsule 2     No current facility-administered medications for this visit. ALLERGIES:  Allergies as of 10/11/2022    (No Known Allergies)       Symptoms of decreased mood absent  Symptoms of anhedonia absent    **If either question is answered in a  positive fashion then complete the PHQ9 Scoring Evaluation and make the appropriate referral**      Immunization status: stated as current, but no records available. Gynecologic History:  Menarche: 14 yo  Menopause at 53 yo     Patient's last menstrual period was 09/06/2016. Sexually Active: Yes    STD History: No     Permanent Sterilization: Yes hyst   Reversible Birth Control: No        Hormone Replacement Exposure: Yes x 1 year- stopped      Genetic Qualified Family History of Breast, Ovarian , Colon or Uterine Cancer: No ( sister with uterine cancer - age 54)      If YES see scanned worksheet. Preventative Health Testing:    Health Maintenance:  Health Maintenance Due   Topic Date Due    Flu vaccine (1) 08/01/2022       Date of Last Pap Smear: 10/11/2021 neg/ +HPV 18  Abnormal Pap Smear History: yes  Colposcopy History:   Date of Last Mammogram: 10/7/2022 negative  Date of Last Colonoscopy: 11/2019 will follow up with DR Morales to see when next colonoscopy is due.   Date of Last Bone Density:      ________________________________________________________________________        REVIEW OF SYSTEMS:    yes   A minimum of an eleven point review of systems was completed. Review Of Systems (11 point):  Constitutional: No fever, chills or malaise; No weight change or fatigue  Head and Eyes: No vision changes, Headache, Dizziness or trauma in last 12 months  ENT ROS: No hearing, Tinnitis, sinus or taste problems  Hematological and Lymphatic ROS:No Lymphoma, Von Willebrand's, Hemophillia or Bleeding History  Psych ROS: No Depression, Homicidal thoughts,suicidal thoughts, or anxiety  Breast ROS: No breast abnormalities or lumps  Respiratory ROS: No SOB, Pneumoniae,Cough, or Pulmonary Embolism   Cardiovascular ROS: No Chest Pain with Exertion, Palpitations, Syncope, Edema, Arrhythmia. + hypertension  Gastrointestinal ROS: No Indigestion, Heartburn, Nausea, vomiting, Diarrhea, Constipation,or Bowel Changes; No Bloody Stools or melena  Genito-Urinary ROS: No Dysuria, Hematuria or Nocturia. No Urinary Incontinence or Vaginal Discharge  Musculoskeletal ROS: No Arthralgia, Arthritis,Gout,Osteoporosis or Rheumatism  Neurological ROS: No CVA, Migraines, Epilepsy, Seizure Hx, or Limb Weakness  Dermatological ROS: No Rash, Itching, Hives, Mole Changes or Cancer                                                                                                                                                                                                                                  PHYSICAL Exam:     Constitutional:  Vitals:    10/11/22 0807   BP: (!) 142/84   Site: Left Upper Arm   Position: Sitting   Cuff Size: Large Adult   Weight: 238 lb 3.2 oz (108 kg)   Height: 5' 1\" (1.549 m)       Chaperone for Intimate Exam  Chaperone was offered and accepted as part of the rooming process. Chaperone: Mai Wolfe          General Appearance: This  is a well Developed, well Nourished, well groomed female.       Her BMI was reviewed. Nutritional decision making was discussed. Skin:  There was a Normal Inspection of the skin without rashes or lesions. There were no rashes. (Papular, Maculopapular, Hives, Pustular, Macular)     There were no lesions (Ulcers, Erythema, Abn. Appearing Nevi)            Lymphatic:  No Lymph Nodes were Palpable in the neck , axilla or groin.  0 # Of Lymph Nodes; Location ; Character [Normal]  [Shotty] [Tender] [Enlarged]     Neck and EENT:  The neck was supple. There were no masses   The thyroid was not enlarged and had no masses. Perrla, EOMI B/L, TMI B/L No Abnormalities. Throat inspected-No exudates or Masses, Nares Patent No Masses        Respiratory: The lungs were auscultated and found to be clear. There were no rales, rhonchi or wheezes. There was a good respiratory effort. Cardiovascular: The heart was in a regular rate and rhythm. . No S3 or S4. There was no murmur appreciated. Location, grade, and radiation are not applicable. Extremities: The patients extremities were without calf tenderness, edema, or varicosities. There was full range of motion in all four extremities. Pulses in all four extremities were appreciated and are 2/4. Abdomen: The abdomen was soft and non-tender. There were good bowel sounds in all quadrants and there was no guarding, rebound or rigidity. On evaluation there was no evidence of hepatosplenomegaly and there was no costal vertebral xavier tenderness bilaterally. No hernias were appreciated. Abdominal Scars: hyst scar    Psych: The patient had a normal Orientation to: Time, Place, Person, and Situation  There is no Mood / Affect changes    Breast:  (Chest)  normal appearance, no masses or tenderness, No nipple retraction or dimpling, No nipple discharge or bleeding, No axillary or supraclavicular adenopathy, Normal to palpation without dominant masses  Self breast exams were reviewed in detail. Literature was given.     Pelvic Exam:  External genitalia: normal general appearance  Urinary system: urethral meatus normal  Vaginal: normal mucosa without prolapse or lesions and cystocele present grade 1-2   Cervix: absent  Adnexa: non palpable  Uterus: absent    Rectal Exam:  exam declined by patient          Musculosk:  Normal Gait and station was noted. Digits were evaluated without abnormal findings. Range of motion, stability and strength were evaluated and found to be appropriate for the patients age. ASSESSMENT:      47 y.o. Annual   Diagnosis Orders   1. Well woman exam with routine gynecological exam  PAP SMEAR      2. Special screening examination for human papillomavirus (HPV)  PAP SMEAR      3. History of HPV infection               Chief Complaint   Patient presents with    Gynecologic Exam     Last pap 10/11/2021 Neg +hpv18  11/4/21 colp neg  Jenna 10/7/2022 Neg  Colonoscopy 11/11/19           Past Medical History:   Diagnosis Date    Anxiety     Cervical ca [facility])     Hyperlipidemia     no meds    Hypertension          Patient Active Problem List    Diagnosis Date Noted    10/5/16 TLH, BSO 10/05/2016     Priority: High     Fibroids/ heavy menses- cervical dysplasia LSIL only noted prior to hysterectomy-NO conization hx      HTN 10/05/2016     Priority: High    Hyperlipemia 10/05/2016     Priority: Medium    History of tubal ligation 10/05/2016     Priority: Medium    History of cholecystectomy 10/05/2016     Priority: Medium    History of abnormal cervical Pap smear 10/05/2016     Priority: Medium    Vaginal high risk human papillomavirus (HPV) DNA test positive (18) 11/03/2021    Hot flashes 02/01/2019    Anxiety 03/08/2016          Hereditary Breast, Ovarian, Colon and Uterine Cancer screening Done. Tobacco & Secondary smoke risks reviewed; instructed on cessation and avoidance      Counseling Completed:  Preventative Health Recommendations and Follow up.   The patient was informed of the recommended preventative health recommendations. 1. Annuals every year; Cytology collections per prevailing guidelines. 2. Mammograms begin every year at 37 yo if no abnormalities are found and no family history. 3. Bone density studies every 2-3 years. Begin at 71 yo. If no fracture history or osteoporosis family history. (significant). 4. Colonoscopy begin at 38 yo. Repeat every ten years if negative and no family history. 5. Calcium of 1254-9828 mg/day in split dosing  6. Vitamin D 400-800 IU/day  7. All other preventative health recommendations will be managed by the patients Primary care physician. PLAN:  Return in about 1 year (around 10/11/2023) for annual.  Has cystocele. Declines follow up with physician at this time. Patient to call GI, Dr Alla Schaumann, office to see when to repeat colonoscopy. Pap smear obtained. Repeat Annual every 1 year  Cervical Cytology Evaluation begins at 24years old. If Negative Cytology, Follow-up screening per current guidelines. Mammograms every 1 year. If 37 yo and last mammogram was negative. Calcium and Vitamin D dosing reviewed. Colonoscopy screening reviewed as well as onset for bone density testing. Birth control and barrier recommendations discussed. STD counseling and prevention reviewed. Gardisil counseling completed for all patients 10-37 yo. Routine health maintenance per patients PCP. Orders Placed This Encounter   Procedures    PAP SMEAR     Standing Status:   Future     Standing Expiration Date:   10/10/2023     Order Specific Question:   Collection Type     Answer: Thin Prep     Order Specific Question:   Prior Abnormal Pap Test     Answer:   Yes     Order Specific Question:   If Prior Abnormal, Give Date     Answer:   10/11/21 +HPV 18; colp negative     Order Specific Question:   Prior Treatment     Answer:   None Given     Order Specific Question:   Screening or Diagnostic     Answer:   Screening     Order Specific Question:   HPV Requested? Answer:    Yes Order Specific Question:   High Risk Patient     Answer:   N/A           The patient, Christine Coffey is a 47 y.o. female, was seen with a total time spent of 20 minutes for the visit on this date of service by the E/M provider. The time component had both face to face and non face to face time spent in determining the total time component. Counseling and education regarding her diagnosis listed below and her options regarding those diagnoses were also included in determining her time component. Diagnosis Orders   1. Well woman exam with routine gynecological exam  PAP SMEAR      2. Special screening examination for human papillomavirus (HPV)  PAP SMEAR      3. History of HPV infection             The patient had her preventative health maintenance recommendations and follow-up reviewed with her at the completion of her visit.

## 2022-10-13 LAB
HPV SAMPLE: ABNORMAL
HPV, GENOTYPE 16: DETECTED
HPV, GENOTYPE 18: DETECTED
HPV, HIGH RISK OTHER: NOT DETECTED
HPV, INTERPRETATION: ABNORMAL
SPECIMEN DESCRIPTION: ABNORMAL

## 2022-10-17 LAB — CYTOLOGY REPORT: NORMAL

## 2022-11-10 ENCOUNTER — TELEPHONE (OUTPATIENT)
Dept: OBGYN CLINIC | Age: 52
End: 2022-11-10

## 2022-11-10 NOTE — TELEPHONE ENCOUNTER
----- Message from HUSAM Reyes CNP sent at 10/17/2022  4:53 PM EDT -----  Pap smear LSIL  +HPV 16, 18  AGE 52  Needs colposcopy scheduled

## 2022-11-10 NOTE — TELEPHONE ENCOUNTER
Per Cat Sheppard pt notified of Pap smear LSIL  +HPV 16, 18/Needs colposcopy scheduled/pt information given to Neida gonzalez with Dr David Merritt.

## 2023-04-07 ENCOUNTER — HOSPITAL ENCOUNTER (OUTPATIENT)
Age: 53
Setting detail: SPECIMEN
Discharge: HOME | End: 2023-04-07

## 2023-04-07 DIAGNOSIS — E55.9 VITAMIN D DEFICIENCY: ICD-10-CM

## 2023-04-07 DIAGNOSIS — R73.03 PRE-DIABETES: ICD-10-CM

## 2023-04-07 DIAGNOSIS — G89.29 CHRONIC BILATERAL LOW BACK PAIN WITHOUT SCIATICA: ICD-10-CM

## 2023-04-07 DIAGNOSIS — F41.9 ANXIETY: ICD-10-CM

## 2023-04-07 DIAGNOSIS — M79.2 LEG NEURALGIA, LEFT: ICD-10-CM

## 2023-04-07 DIAGNOSIS — E66.01 CLASS 3 SEVERE OBESITY DUE TO EXCESS CALORIES WITH SERIOUS COMORBIDITY AND BODY MASS INDEX (BMI) OF 40.0 TO 44.9 IN ADULT (HCC): ICD-10-CM

## 2023-04-07 DIAGNOSIS — I10 ESSENTIAL HYPERTENSION: ICD-10-CM

## 2023-04-07 DIAGNOSIS — E78.2 MIXED HYPERLIPIDEMIA: ICD-10-CM

## 2023-04-07 DIAGNOSIS — M54.50 CHRONIC BILATERAL LOW BACK PAIN WITHOUT SCIATICA: ICD-10-CM

## 2023-04-07 LAB
25(OH)D3 SERPL-MCNC: 38.9 NG/ML
ABSOLUTE EOS #: 0.24 K/UL (ref 0–0.44)
ABSOLUTE IMMATURE GRANULOCYTE: 0.03 K/UL (ref 0–0.3)
ABSOLUTE LYMPH #: 2.22 K/UL (ref 1.1–3.7)
ABSOLUTE MONO #: 0.47 K/UL (ref 0.1–1.2)
ALBUMIN SERPL-MCNC: 3.8 G/DL (ref 3.5–5.2)
ALBUMIN/GLOBULIN RATIO: 1.2 (ref 1–2.5)
ALP SERPL-CCNC: 121 U/L (ref 35–104)
ALT SERPL-CCNC: 22 U/L (ref 5–33)
ANION GAP SERPL CALCULATED.3IONS-SCNC: 13 MMOL/L (ref 9–17)
AST SERPL-CCNC: 22 U/L
BASOPHILS # BLD: 1 % (ref 0–2)
BASOPHILS ABSOLUTE: 0.07 K/UL (ref 0–0.2)
BILIRUB SERPL-MCNC: 0.4 MG/DL (ref 0.3–1.2)
BUN SERPL-MCNC: 14 MG/DL (ref 6–20)
CALCIUM SERPL-MCNC: 9.3 MG/DL (ref 8.6–10.4)
CHLORIDE SERPL-SCNC: 102 MMOL/L (ref 98–107)
CHOLEST SERPL-MCNC: 150 MG/DL
CHOLESTEROL/HDL RATIO: 3.6
CO2 SERPL-SCNC: 23 MMOL/L (ref 20–31)
CREAT SERPL-MCNC: 0.68 MG/DL (ref 0.5–0.9)
EOSINOPHILS RELATIVE PERCENT: 3 % (ref 1–4)
GFR SERPL CREATININE-BSD FRML MDRD: >60 ML/MIN/1.73M2
GLUCOSE SERPL-MCNC: 70 MG/DL (ref 70–99)
HCT VFR BLD AUTO: 46.8 % (ref 36.3–47.1)
HDLC SERPL-MCNC: 42 MG/DL
HGB BLD-MCNC: 13.7 G/DL (ref 11.9–15.1)
IMMATURE GRANULOCYTES: 0 %
LDLC SERPL CALC-MCNC: 90 MG/DL (ref 0–130)
LYMPHOCYTES # BLD: 27 % (ref 24–43)
MCH RBC QN AUTO: 24.7 PG (ref 25.2–33.5)
MCHC RBC AUTO-ENTMCNC: 29.3 G/DL (ref 28.4–34.8)
MCV RBC AUTO: 84.5 FL (ref 82.6–102.9)
MONOCYTES # BLD: 6 % (ref 3–12)
NRBC AUTOMATED: 0 PER 100 WBC
PDW BLD-RTO: 17.4 % (ref 11.8–14.4)
PLATELET # BLD AUTO: 350 K/UL (ref 138–453)
PMV BLD AUTO: 10.6 FL (ref 8.1–13.5)
POTASSIUM SERPL-SCNC: 4.9 MMOL/L (ref 3.7–5.3)
PROT SERPL-MCNC: 7.1 G/DL (ref 6.4–8.3)
RBC # BLD: 5.54 M/UL (ref 3.95–5.11)
RBC # BLD: ABNORMAL 10*6/UL
SEG NEUTROPHILS: 63 % (ref 36–65)
SEGMENTED NEUTROPHILS ABSOLUTE COUNT: 5.14 K/UL (ref 1.5–8.1)
SODIUM SERPL-SCNC: 138 MMOL/L (ref 135–144)
TRIGL SERPL-MCNC: 90 MG/DL
TSH SERPL-ACNC: 1.81 UIU/ML (ref 0.3–5)
WBC # BLD AUTO: 8.2 K/UL (ref 3.5–11.3)

## 2023-04-08 LAB
EST. AVERAGE GLUCOSE BLD GHB EST-MCNC: 128 MG/DL
HBA1C MFR BLD: 6.1 % (ref 4–6)

## 2023-08-10 ENCOUNTER — HOSPITAL ENCOUNTER (EMERGENCY)
Facility: CLINIC | Age: 53
Discharge: HOME | End: 2023-08-10
Attending: EMERGENCY MEDICINE
Payer: COMMERCIAL

## 2023-08-10 VITALS
BODY MASS INDEX: 44.37 KG/M2 | HEIGHT: 61 IN | HEART RATE: 78 BPM | DIASTOLIC BLOOD PRESSURE: 71 MMHG | TEMPERATURE: 98.3 F | WEIGHT: 235 LBS | SYSTOLIC BLOOD PRESSURE: 128 MMHG | OXYGEN SATURATION: 98 % | RESPIRATION RATE: 16 BRPM

## 2023-08-10 DIAGNOSIS — L23.7 POISON IVY DERMATITIS: Primary | ICD-10-CM

## 2023-08-10 PROCEDURE — 96372 THER/PROPH/DIAG INJ SC/IM: CPT

## 2023-08-10 PROCEDURE — 99284 EMERGENCY DEPT VISIT MOD MDM: CPT

## 2023-08-10 PROCEDURE — 6360000002 HC RX W HCPCS: Performed by: EMERGENCY MEDICINE

## 2023-08-10 RX ORDER — DEXAMETHASONE SODIUM PHOSPHATE 10 MG/ML
10 INJECTION, SOLUTION INTRAMUSCULAR; INTRAVENOUS ONCE
Status: COMPLETED | OUTPATIENT
Start: 2023-08-10 | End: 2023-08-10

## 2023-08-10 RX ADMIN — DEXAMETHASONE SODIUM PHOSPHATE 10 MG: 10 INJECTION, SOLUTION INTRAMUSCULAR; INTRAVENOUS at 18:32

## 2023-08-10 ASSESSMENT — ENCOUNTER SYMPTOMS
NAUSEA: 0
SORE THROAT: 0
DIARRHEA: 0
BACK PAIN: 0
COUGH: 0
VOMITING: 0
ABDOMINAL PAIN: 0
SHORTNESS OF BREATH: 0
WHEEZING: 0

## 2023-08-10 ASSESSMENT — LIFESTYLE VARIABLES
HOW MANY STANDARD DRINKS CONTAINING ALCOHOL DO YOU HAVE ON A TYPICAL DAY: PATIENT DOES NOT DRINK
HOW OFTEN DO YOU HAVE A DRINK CONTAINING ALCOHOL: NEVER

## 2023-08-10 NOTE — ED PROVIDER NOTES
[facility]  [address]  Phone: [phone]    eMERGENCY dEPARTMENT eNCOUnter        Pt Name: Janina Loera  MRN: [id number]  [facility] 1970  Date of evaluation: 8/10/23    CHIEF COMPLAINT     Chief Complaint   Patient presents with    Rash       HISTORY OF PRESENT ILLNESS    Janina Loera is a 47 y.o. female who presents to the emergency department with a rash. She has had it on her upper torso now for at least a week. Is quite itchy. It is nowhere else. 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 They insisted she be seen and evaluated. She has tried over-the-counter creams anticream remedies without much relief. This chest is nowhere else to be found. She denies any chest pain or shortness of breath. No wheezing. No mouth lesions. Nobody else in the household has this rash. She stated that she is trying to dry it up with alcohol swabs at night. REVIEW OF SYSTEMS     Review of Systems   Constitutional:  Negative for chills and fever. HENT:  Negative for congestion, ear pain and sore throat. Respiratory:  Negative for cough, shortness of breath and wheezing. Cardiovascular:  Negative for chest pain, palpitations and leg swelling. Gastrointestinal:  Negative for abdominal pain, diarrhea, nausea and vomiting. Genitourinary:  Negative for dysuria, flank pain, frequency and hematuria. Musculoskeletal:  Negative for back pain. Skin:  Positive for rash. Neurological:  Negative for dizziness, light-headedness, numbness and headaches. PAST MEDICAL HISTORY    has a past medical history of Anxiety, Cervical ca ([address]), Hyperlipidemia, and Hypertension. SURGICAL HISTORY      has a past surgical history that includes Tubal ligation; Cholecystectomy; Skin graft (Right); Total abdominal hysterectomy w/ bilateral salpingoophorectomy (10/05/2016); Hysterectomy; Colonoscopy; and Colonoscopy (N/A, 11/11/2019).     CURRENT her pulse is 78. Her respiration is 16 and oxygen saturation is 98%. Physical Exam  Vitals and nursing note reviewed. Constitutional:       Appearance: Normal appearance. HENT:      Head: Normocephalic and atraumatic. Nose: Nose normal.      Mouth/Throat:      Mouth: Mucous membranes are moist.   Eyes:      Extraocular Movements: Extraocular movements intact. Pupils: Pupils are equal, round, and reactive to light. Cardiovascular:      Rate and Rhythm: Normal rate and regular rhythm. Pulses: Normal pulses. Heart sounds: Normal heart sounds. Pulmonary:      Effort: Pulmonary effort is normal.      Breath sounds: Normal breath sounds. Abdominal:      General: Abdomen is flat. Bowel sounds are normal.      Palpations: Abdomen is soft. Comments: No pulsatile mass   Musculoskeletal:         General: Normal range of motion. Cervical back: Normal range of motion and neck supple. Skin:     General: Skin is warm and dry. Comments: Just on the neckline of the patient she is got pinpoint lesions some scabs some in linear fashion that are all blanchable and pruritic. Neurological:      General: No focal deficit present. Mental Status: She is alert and oriented to person, place, and time. Mental status is at baseline. Psychiatric:         Mood and Affect: Mood normal.         Behavior: Behavior normal.     DIFFERENTIAL DIAGNOSIS / MDM / EMERGENCY DEPARTMENT COURSE:     The patient was personally seen and evaluated by myself. Head to toe examination only revealed this localized area at her neck line. It could be a contact dermatitis to or poison ivy. She states she is outside quite often has been exposed. This point time we will give her a shot of Decadron tell her to use calamine lotion and discharge her home. Patient has dermatology appointment already for an evaluation for a facial lesion and she will does follow-up with them at that time.   There is no need for

## 2023-08-10 NOTE — DISCHARGE INSTRUCTIONS
Return to the emergency department if symptoms worsen or persist.  Follow-up with your dermatologist as scheduled for next week. Use calamine lotion as needed.

## 2023-08-24 ENCOUNTER — OFFICE VISIT (OUTPATIENT)
Dept: DERMATOLOGY | Age: 53
End: 2023-08-24

## 2023-08-24 VITALS
BODY MASS INDEX: 43.43 KG/M2 | HEIGHT: 61 IN | OXYGEN SATURATION: 94 % | TEMPERATURE: 97.3 F | DIASTOLIC BLOOD PRESSURE: 89 MMHG | HEART RATE: 80 BPM | WEIGHT: 230 LBS | SYSTOLIC BLOOD PRESSURE: 140 MMHG

## 2023-08-24 DIAGNOSIS — L25.5 RHUS DERMATITIS: ICD-10-CM

## 2023-08-24 DIAGNOSIS — D48.9 NEOPLASM OF UNCERTAIN BEHAVIOR: Primary | ICD-10-CM

## 2023-08-24 RX ORDER — LIDOCAINE HYDROCHLORIDE AND EPINEPHRINE 10; 10 MG/ML; UG/ML
2 INJECTION, SOLUTION INFILTRATION; PERINEURAL ONCE
Status: SHIPPED | OUTPATIENT
Start: 2023-08-24

## 2023-08-24 NOTE — PROGRESS NOTES
Dermatology Patient Note  [address]  [address] [address]  Dept: [phone]  Dept Fax: [phone]      VISITDATE: 8/24/2023   REFERRING PROVIDER: No ref. provider found      Olga Keller is a 47 y.o. female  who presents today in the office for:    New Patient (New pt- here for lesion on face x3 years, states that it does bleed at times. States no other symptoms with it. It started when we had to wear mask and increased in size in over the last year. William Deng on right foot that has been there for less than 3 weeks that itches. /Pt father has hx of skin cancer )      HISTORY OF PRESENT ILLNESS:  As above. Pt has been using calamine lotion on her right foot, which has helped with the pruritus. Pruritus has decreased substantially over the past week.     MEDICAL PROBLEMS:  Patient Active Problem List    Diagnosis Date Noted    10/5/16 TLH, BSO 10/05/2016     Priority: High     Fibroids/ heavy menses- cervical dysplasia LSIL only noted prior to hysterectomy-NO conization hx        HTN 10/05/2016     Priority: High    Hyperlipemia 10/05/2016     Priority: Medium    History of tubal ligation 10/05/2016     Priority: Medium    History of cholecystectomy 10/05/2016     Priority: Medium    History of abnormal cervical Pap smear 10/05/2016     Priority: Medium    Vaginal high risk human papillomavirus (HPV) DNA test positive (18) 11/03/2021    Hot flashes 02/01/2019    Anxiety 03/08/2016       CURRENT MEDICATIONS:   Current Outpatient Medications   Medication Sig Dispense Refill    vitamin D (ERGOCALCIFEROL) 1.25 MG (54790 UT) CAPS capsule Take 1 capsule by mouth once a week 12 capsule 0    lisinopril (PRINIVIL;ZESTRIL) 10 MG tablet Take 1 tablet by mouth once daily 90 tablet 0    amLODIPine (NORVASC) 10 MG tablet Take 1 tablet by mouth once daily 30 tablet 5    diclofenac (VOLTAREN) 75 MG EC tablet Take 1

## 2023-08-25 ENCOUNTER — NURSE ONLY (OUTPATIENT)
Dept: LAB | Age: 53
End: 2023-08-25

## 2023-08-29 NOTE — RESULT ENCOUNTER NOTE
The lesion we biopsied is the most common and least serious form of skin cancer, a basal cell carcinoma. Due to location, it should be removed with mohs surgery, which gets the highest cure rate (>99%) while removing the least amount of skin. Referral will be placed to Dr. Rodrigue Thornton. Please schedule follow-up with me in 6 months if not already scheduled.

## 2023-10-20 ENCOUNTER — OFFICE VISIT (OUTPATIENT)
Dept: OBGYN CLINIC | Age: 53
End: 2023-10-20
Payer: COMMERCIAL

## 2023-10-20 ENCOUNTER — HOSPITAL ENCOUNTER (OUTPATIENT)
Age: 53
Setting detail: SPECIMEN
Discharge: HOME | End: 2023-10-20

## 2023-10-20 VITALS
BODY MASS INDEX: 45.5 KG/M2 | SYSTOLIC BLOOD PRESSURE: 132 MMHG | HEIGHT: 61 IN | WEIGHT: 241 LBS | DIASTOLIC BLOOD PRESSURE: 86 MMHG

## 2023-10-20 DIAGNOSIS — N76.0 ACUTE VAGINITIS: ICD-10-CM

## 2023-10-20 DIAGNOSIS — Z12.31 ENCOUNTER FOR SCREENING MAMMOGRAM FOR MALIGNANT NEOPLASM OF BREAST: ICD-10-CM

## 2023-10-20 DIAGNOSIS — Z11.51 SCREENING FOR HPV (HUMAN PAPILLOMAVIRUS): ICD-10-CM

## 2023-10-20 DIAGNOSIS — R23.2 HOT FLASHES: ICD-10-CM

## 2023-10-20 DIAGNOSIS — Z01.419 VISIT FOR GYNECOLOGIC EXAMINATION: Primary | ICD-10-CM

## 2023-10-20 LAB
CANDIDA SPECIES: NEGATIVE
GARDNERELLA VAGINALIS: POSITIVE
SOURCE: ABNORMAL
TRICHOMONAS: NEGATIVE

## 2023-10-20 PROCEDURE — 3079F DIAST BP 80-89 MM HG: CPT | Performed by: NURSE PRACTITIONER

## 2023-10-20 PROCEDURE — 3075F SYST BP GE 130 - 139MM HG: CPT | Performed by: NURSE PRACTITIONER

## 2023-10-20 PROCEDURE — 99396 PREV VISIT EST AGE 40-64: CPT | Performed by: NURSE PRACTITIONER

## 2023-10-20 PROCEDURE — G8484 FLU IMMUNIZE NO ADMIN: HCPCS | Performed by: NURSE PRACTITIONER

## 2023-10-20 RX ORDER — ESCITALOPRAM OXALATE 10 MG/1
10 TABLET ORAL DAILY
Qty: 90 TABLET | Refills: 2 | Status: SHIPPED | OUTPATIENT
Start: 2023-10-20 | End: 2024-01-18

## 2023-10-20 RX ORDER — CLOTRIMAZOLE AND BETAMETHASONE DIPROPIONATE 10; .64 MG/G; MG/G
CREAM TOPICAL
Qty: 45 G | Refills: 1 | Status: SHIPPED | OUTPATIENT
Start: 2023-10-20

## 2023-10-20 ASSESSMENT — PATIENT HEALTH QUESTIONNAIRE - PHQ9
SUM OF ALL RESPONSES TO PHQ9 QUESTIONS 1 & 2: 0
SUM OF ALL RESPONSES TO PHQ QUESTIONS 1-9: 0
2. FEELING DOWN, DEPRESSED OR HOPELESS: 0
1. LITTLE INTEREST OR PLEASURE IN DOING THINGS: 0
SUM OF ALL RESPONSES TO PHQ QUESTIONS 1-9: 0

## 2023-10-20 NOTE — PROGRESS NOTES
recommendations discussed. STD counseling and prevention reviewed. Gardisil counseling completed for all patients 7-36 yo. Routine health maintenance per patients PCP. Orders Placed This Encounter   Procedures    Vaginitis DNA Probe     Standing Status:   Future     Standing Expiration Date:   10/20/2024    C.trachomatis N.gonorrhoeae DNA     Standing Status:   Future     Standing Expiration Date:   11/20/2023    CHARLY DIGITAL SCREEN W OR WO CAD BILATERAL     Standing Status:   Future     Standing Expiration Date:   12/20/2024     Order Specific Question:   Reason for exam:     Answer:   SCREENING    PAP Smear     Patient History:    Patient's last menstrual period was 09/06/2016. OBGYN Status: Hysterectomy  Past Surgical History:  No date: CHOLECYSTECTOMY  No date: COLONOSCOPY      Comment:  in her early 28's  11/11/2019: COLONOSCOPY; N/A      Comment:  COLORECTAL CANCER SCREENING, HIGH RISK performed by                Darden Sicard, MD at [facility]  No date: HYSTERECTOMY (CERVIX STATUS UNKNOWN)  No date: SKIN GRAFT; Right      Comment:  rt middle finger  10/05/2016: MIGUEL AND BSO (CERVIX REMOVED)  No date: TUBAL LIGATION    Problem List        Edg Problems Affecting Cytology    S/P laparoscopic hysterectomy    Overview Addendum 11/4/2021  9:41 AM by Suresh Lowe, DO     Fibroids/ heavy menses- cervical dysplasia LSIL only noted prior to   hysterectomy-NO conization hx        Social History    Tobacco Use      Smoking status: Never      Smokeless tobacco: Never       Standing Status:   Future     Standing Expiration Date:   10/20/2024     Order Specific Question:   Collection Type     Answer: Thin Prep     Order Specific Question:   Prior Abnormal Pap Test     Answer:   No     Order Specific Question:   Screening or Diagnostic     Answer:   Screening     Order Specific Question:   HPV Requested?      Answer:   Yes     Order Specific Question:   High Risk Patient     Answer:   N/A           The

## 2023-10-23 ENCOUNTER — TELEPHONE (OUTPATIENT)
Dept: OBGYN CLINIC | Age: 53
End: 2023-10-23

## 2023-10-23 LAB
C TRACH DNA SPEC QL PROBE+SIG AMP: NEGATIVE
N GONORRHOEA DNA SPEC QL PROBE+SIG AMP: NEGATIVE
SPECIMEN DESCRIPTION: NORMAL

## 2023-10-23 NOTE — TELEPHONE ENCOUNTER
----- Message from HUSAM Santana CNP sent at 10/23/2023  7:56 AM EDT -----  +BV- flagyl 500mg PO BID x7 days

## 2023-10-24 RX ORDER — METRONIDAZOLE 500 MG/1
500 TABLET ORAL 2 TIMES DAILY
Qty: 14 TABLET | Refills: 0 | Status: SHIPPED | OUTPATIENT
Start: 2023-10-24 | End: 2023-10-31

## 2023-10-24 NOTE — TELEPHONE ENCOUNTER
Patient notified of results and recommendations, script for flagyl to be sent to [facility] on [address].

## 2023-10-25 LAB
HPV I/H RISK 4 DNA CVX QL NAA+PROBE: NOT DETECTED
HPV SAMPLE: ABNORMAL
HPV, INTERPRETATION: ABNORMAL
HPV16 DNA CVX QL NAA+PROBE: NOT DETECTED
HPV18 DNA CVX QL NAA+PROBE: DETECTED
SPECIMEN DESCRIPTION: ABNORMAL

## 2023-11-03 LAB — CYTOLOGY REPORT: NORMAL

## 2023-11-14 ENCOUNTER — TELEPHONE (OUTPATIENT)
Dept: OBGYN CLINIC | Age: 53
End: 2023-11-14

## 2023-11-14 NOTE — TELEPHONE ENCOUNTER
Per Tasneem Lane pt notified of results and pt stated she will call office back to get procedure scheduled.      Pap smear LSIL  +HPV 18  AGE 53  Needs colposcopy scheduled soon 99

## 2023-11-14 NOTE — TELEPHONE ENCOUNTER
----- Message from HUSAM Neves CNP sent at 11/6/2023  8:04 AM EST -----  Pap smear LSIL  +HPV 18  AGE 53  Needs colposcopy scheduled soon.

## 2024-05-21 ENCOUNTER — HOSPITAL ENCOUNTER (OUTPATIENT)
Age: 54
Setting detail: SPECIMEN
Discharge: HOME | End: 2024-05-21

## 2024-05-21 ENCOUNTER — HOSPITAL ENCOUNTER (OUTPATIENT)
Dept: GENERAL RADIOLOGY | Facility: CLINIC | Age: 54
Discharge: HOME | End: 2024-05-23
Payer: COMMERCIAL

## 2024-05-21 ENCOUNTER — HOSPITAL ENCOUNTER (OUTPATIENT)
Facility: CLINIC | Age: 54
Discharge: HOME | End: 2024-05-23
Payer: COMMERCIAL

## 2024-05-21 DIAGNOSIS — I10 ESSENTIAL HYPERTENSION: ICD-10-CM

## 2024-05-21 DIAGNOSIS — E66.01 CLASS 3 SEVERE OBESITY DUE TO EXCESS CALORIES WITH SERIOUS COMORBIDITY AND BODY MASS INDEX (BMI) OF 45.0 TO 49.9 IN ADULT (HCC): ICD-10-CM

## 2024-05-21 DIAGNOSIS — E78.2 MIXED HYPERLIPIDEMIA: ICD-10-CM

## 2024-05-21 DIAGNOSIS — M54.50 CHRONIC BILATERAL LOW BACK PAIN WITHOUT SCIATICA: ICD-10-CM

## 2024-05-21 DIAGNOSIS — E55.9 VITAMIN D DEFICIENCY: ICD-10-CM

## 2024-05-21 DIAGNOSIS — R73.03 PREDIABETES: ICD-10-CM

## 2024-05-21 DIAGNOSIS — G89.29 CHRONIC BILATERAL LOW BACK PAIN WITHOUT SCIATICA: ICD-10-CM

## 2024-05-21 LAB
25(OH)D3 SERPL-MCNC: 33.2 NG/ML (ref 30–100)
ALBUMIN SERPL-MCNC: 4.3 G/DL (ref 3.5–5.2)
ALBUMIN/GLOB SERPL: 1 {RATIO} (ref 1–2.5)
ALP SERPL-CCNC: 141 U/L (ref 35–104)
ALT SERPL-CCNC: 23 U/L (ref 10–35)
ANION GAP SERPL CALCULATED.3IONS-SCNC: 12 MMOL/L (ref 9–16)
AST SERPL-CCNC: 31 U/L (ref 10–35)
BASOPHILS # BLD: 0.06 K/UL (ref 0–0.2)
BASOPHILS NFR BLD: 1 % (ref 0–2)
BILIRUB SERPL-MCNC: 0.6 MG/DL (ref 0–1.2)
BUN SERPL-MCNC: 15 MG/DL (ref 6–20)
CALCIUM SERPL-MCNC: 9.4 MG/DL (ref 8.6–10.4)
CHLORIDE SERPL-SCNC: 104 MMOL/L (ref 98–107)
CHOLEST SERPL-MCNC: 174 MG/DL (ref 0–199)
CHOLESTEROL/HDL RATIO: 4
CO2 SERPL-SCNC: 22 MMOL/L (ref 20–31)
CREAT SERPL-MCNC: 0.7 MG/DL (ref 0.5–0.9)
EOSINOPHIL # BLD: 0.2 K/UL (ref 0–0.44)
EOSINOPHILS RELATIVE PERCENT: 3 % (ref 1–4)
ERYTHROCYTE [DISTWIDTH] IN BLOOD BY AUTOMATED COUNT: 18.6 % (ref 11.8–14.4)
EST. AVERAGE GLUCOSE BLD GHB EST-MCNC: 126 MG/DL
GFR, ESTIMATED: >90 ML/MIN/1.73M2
GLUCOSE SERPL-MCNC: 85 MG/DL (ref 74–99)
HBA1C MFR BLD: 6 % (ref 4–6)
HCT VFR BLD AUTO: 44.2 % (ref 36.3–47.1)
HDLC SERPL-MCNC: 44 MG/DL
HGB BLD-MCNC: 13.5 G/DL (ref 11.9–15.1)
IMM GRANULOCYTES # BLD AUTO: 0.04 K/UL (ref 0–0.3)
IMM GRANULOCYTES NFR BLD: 1 %
LDLC SERPL CALC-MCNC: 106 MG/DL (ref 0–100)
LYMPHOCYTES NFR BLD: 2.91 K/UL (ref 1.1–3.7)
LYMPHOCYTES RELATIVE PERCENT: 36 % (ref 24–43)
MCH RBC QN AUTO: 24.5 PG (ref 25.2–33.5)
MCHC RBC AUTO-ENTMCNC: 30.5 G/DL (ref 28.4–34.8)
MCV RBC AUTO: 80.1 FL (ref 82.6–102.9)
MONOCYTES NFR BLD: 0.5 K/UL (ref 0.1–1.2)
MONOCYTES NFR BLD: 6 % (ref 3–12)
NEUTROPHILS NFR BLD: 53 % (ref 36–65)
NEUTS SEG NFR BLD: 4.45 K/UL (ref 1.5–8.1)
NRBC BLD-RTO: 0 PER 100 WBC
PLATELET # BLD AUTO: 409 K/UL (ref 138–453)
PMV BLD AUTO: 10.4 FL (ref 8.1–13.5)
POTASSIUM SERPL-SCNC: 4.6 MMOL/L (ref 3.7–5.3)
PROT SERPL-MCNC: 7.5 G/DL (ref 6.6–8.7)
RBC # BLD AUTO: 5.52 M/UL (ref 3.95–5.11)
RBC # BLD: ABNORMAL 10*6/UL
SODIUM SERPL-SCNC: 138 MMOL/L (ref 136–145)
TRIGL SERPL-MCNC: 122 MG/DL
VLDLC SERPL CALC-MCNC: 24 MG/DL
WBC OTHER # BLD: 8.2 K/UL (ref 3.5–11.3)

## 2024-05-21 PROCEDURE — 72100 X-RAY EXAM L-S SPINE 2/3 VWS: CPT

## 2024-06-18 ENCOUNTER — OFFICE VISIT (OUTPATIENT)
Dept: ORTHOPEDIC SURGERY | Age: 54
End: 2024-06-18
Payer: COMMERCIAL

## 2024-06-18 VITALS — RESPIRATION RATE: 14 BRPM | HEIGHT: 61 IN | WEIGHT: 240.08 LBS | BODY MASS INDEX: 45.33 KG/M2

## 2024-06-18 DIAGNOSIS — G89.29 CHRONIC MIDLINE LOW BACK PAIN WITHOUT SCIATICA: Primary | ICD-10-CM

## 2024-06-18 DIAGNOSIS — M54.50 CHRONIC MIDLINE LOW BACK PAIN WITHOUT SCIATICA: Primary | ICD-10-CM

## 2024-06-18 PROCEDURE — 1036F TOBACCO NON-USER: CPT | Performed by: ORTHOPAEDIC SURGERY

## 2024-06-18 PROCEDURE — 3017F COLORECTAL CA SCREEN DOC REV: CPT | Performed by: ORTHOPAEDIC SURGERY

## 2024-06-18 PROCEDURE — G8427 DOCREV CUR MEDS BY ELIG CLIN: HCPCS | Performed by: ORTHOPAEDIC SURGERY

## 2024-06-18 PROCEDURE — G8417 CALC BMI ABV UP PARAM F/U: HCPCS | Performed by: ORTHOPAEDIC SURGERY

## 2024-06-18 PROCEDURE — 99203 OFFICE O/P NEW LOW 30 MIN: CPT | Performed by: ORTHOPAEDIC SURGERY

## 2024-06-18 NOTE — PROGRESS NOTES
Patient ID: Florinda Fierro is a 53 y.o. female    Chief Compliant:  Chief Complaint   Patient presents with    Lower Back Pain        Diagnostic imaging:    AP lateral lumbar spine some disc base collapse at L4-5 and L5-S1    Assessment and Plan:  1. Chronic midline low back pain without sciatica        Patient complains of chronic axial low back pain that has been ongoing for 7 years.    PT lumbar spine    Follow up 5 weeks    HPI:  This is a 53 y.o. female who presents to the clinic today as a new patient for low back evaluation.     Patient complains of chronic axial low back pain that has been ongoing for 7 years.    She has been taking Tramadol and NSAID's for her pain.     Patient has been doing at home exercises once a day that she received from [facility].  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      Past History:    Current Outpatient Medications:     traMADol (ULTRAM) 50 MG tablet, Take 1 tablet by mouth daily for 120 days. Intended supply: 3 days. Take lowest dose possible to manage pain Max Daily Amount: 50 mg, Disp: 30 tablet, Rfl: 3    amLODIPine (NORVASC) 10 MG tablet, Take 1 tablet by mouth daily, Disp: 90 tablet, Rfl: 1    atorvastatin (LIPITOR) 20 MG tablet, Take 1 tablet by mouth daily, Disp: 90 tablet, Rfl: 1    chlorzoxazone (PARAFON FORTE) 500 MG tablet, Take 1 tablet by mouth 3 times daily as needed for Muscle spasms, Disp: 270 tablet, Rfl: 1    diclofenac (VOLTAREN) 75 MG EC tablet, Take 1 tablet by mouth 2 times daily, Disp: 180 tablet, Rfl: 1    escitalopram (LEXAPRO) 10 MG tablet, Take 1 tablet by mouth daily, Disp: 90 tablet, Rfl: 2    gabapentin (NEURONTIN) 100 MG capsule, Take 1 capsule by mouth 3 times daily for 180 days., Disp: 270 capsule, Rfl: 1    lisinopril (PRINIVIL;ZESTRIL) 10 MG tablet, Take 1 tablet by mouth daily, Disp: 90 tablet, Rfl: 1    vitamin D

## 2024-07-03 ENCOUNTER — HOSPITAL ENCOUNTER (OUTPATIENT)
Dept: PHYSICAL THERAPY | Age: 54
Setting detail: THERAPIES SERIES
Discharge: HOME | End: 2024-07-03
Payer: COMMERCIAL

## 2024-07-03 PROCEDURE — 97110 THERAPEUTIC EXERCISES: CPT

## 2024-07-03 PROCEDURE — 97162 PT EVAL MOD COMPLEX 30 MIN: CPT

## 2024-07-03 NOTE — THERAPY EVALUATION
[facility]   Outpatient Rehabilitation & Therapy  [address]  P: [phone]   F: [phone]     Physical Therapy Evaluation    Date:  7/3/2024  Patient: Florinda Fierro  : 1970  MRN: [id number]  Physician: Charlse Coleman MD      Insurance: [employer] mutual (60$ copay, NPRE, )  Medical Diagnosis:   M54.50, G89.29 (ICD-10-CM) - Chronic midline low back pain without sciatica   Rehab Codes: M54.50 Low back pain  Onset Date:                                  Next 's appt: 24    Subjective:   CC: Low back pain  HPI: Low back pain that has been going on for years. No relief with anything. She works as a  at [employer]. She reports that she walks her dog 2x/week. Walking endurance: 15-20 minutes. She reports that she was doing stretches at home that she learned from [employer]. Pain meds will ease the pain but not take it away. She will wake up at night due to pain. She reports she cannot tolerate laying supine with legs straight. Right side is more bothersome than left.     Aggravating Factors: bending twisting turning, standing still   Alleviating Factors: hot showers, sitting in her car.     PMHx: [] Unremarkable [] Diabetes [x] HTN  [] Pacemaker   [] MI/Heart Problems [] Cancer [] Arthritis [] Asthma                         [x] refer to full medical chart  In [facility]  [] Other:        Comorbidities:   [x] Obesity [] Dialysis  [] Other:   [] Asthma/COPD [] Dementia [] Other:   [] Stroke [] Sleep apnea [] Other:   [] Vascular disease [] Rheumatic disease [] Other:     Tests: [x] X-Ray: 24 [] MRI:  [] Other:  FINDINGS:  The vertebral bodies are normally aligned.The vertebral bodies are normal in  height without evidence of fracture.Multilevel degenerative disc and joint  disease is evident.     IMPRESSION:  1. Multilevel degenerative changes.      Medications: [x] Refer to full medical record [] None [] Other:  Allergies:      [x] Refer to full medical

## 2024-07-16 NOTE — FLOWSHEET NOTE
[facility]   Outpatient Rehabilitation & Therapy  [address]  P: [phone]   F: [phone]    Physical Therapy Daily Treatment Note      Date:  2024  Patient Name:  Florinda Fierro    :  1970  MRN: [id number]  Physician: Charles Coleman MD                                  Insurance: Medical mutual (60$ copay, NPRE, )  Medical Diagnosis:   M54.50, G89.29 (ICD-10-CM) - Chronic midline low back pain without sciatica   Rehab Codes: M54.50 Low back pain  Onset Date:                                  Next 's appt: 24  Visit# / total visits: 2/6  Cancels/No Shows: 0/0    Subjective:  Patient reporting to therapy from work stating she has increased pain this session.  Patient reporting her HEP makes her sore but not as sore as work.    Pain:  [x] Yes  [] No Location: Low back Pain Rating: (0-10 scale) 7/10  Pain altered Tx:  [] No  [] Yes  Action:  Comments:    Objective:  Modalities:   Precautions:  Exercises: Supine program with CP 30 mins  Exercise Reps/ Time Weight/ Level Completed Comments   Glute squeeze 10x   x     LTR 10x  3\" x     TA activation  10x3\"   x     Piriformis stretch 3x 30\"  x    Hamstring stretch 3x 30\"  x    SKTC 3x15\"   x      PPT 10x 5\"   x                Other:     Specific Instructions for next treatment:proximal hip strengthening, core activation      Assessment: [] Progressing toward goals.    [] No change.     [x] Other:  Initial session post eval-  Began with review of HEP with education on abdominal bracing to improve core/trunk support.  Patient was also educated on PPT to promote more neutral spinal alignment to decrease excess stress on the low back.  Provided patient with CP during supine exercises.  Added posterior chain stretching to decrease tightness to improve lumbar mobility.  The patient was also educated on increasing awareness to posture and ensuring core activation when performing work tasks to improve trunk support.

## 2024-07-17 ENCOUNTER — HOSPITAL ENCOUNTER (OUTPATIENT)
Dept: PHYSICAL THERAPY | Age: 54
Setting detail: THERAPIES SERIES
Discharge: HOME | End: 2024-07-17
Payer: COMMERCIAL

## 2024-07-17 PROCEDURE — 97110 THERAPEUTIC EXERCISES: CPT

## 2024-07-23 ENCOUNTER — OFFICE VISIT (OUTPATIENT)
Dept: ORTHOPEDIC SURGERY | Age: 54
End: 2024-07-23
Payer: COMMERCIAL

## 2024-07-23 DIAGNOSIS — G89.29 CHRONIC MIDLINE LOW BACK PAIN WITHOUT SCIATICA: Primary | ICD-10-CM

## 2024-07-23 DIAGNOSIS — M54.50 CHRONIC MIDLINE LOW BACK PAIN WITHOUT SCIATICA: Primary | ICD-10-CM

## 2024-07-23 PROCEDURE — 3017F COLORECTAL CA SCREEN DOC REV: CPT | Performed by: ORTHOPAEDIC SURGERY

## 2024-07-23 PROCEDURE — 1036F TOBACCO NON-USER: CPT | Performed by: ORTHOPAEDIC SURGERY

## 2024-07-23 PROCEDURE — G8428 CUR MEDS NOT DOCUMENT: HCPCS | Performed by: ORTHOPAEDIC SURGERY

## 2024-07-23 PROCEDURE — G8417 CALC BMI ABV UP PARAM F/U: HCPCS | Performed by: ORTHOPAEDIC SURGERY

## 2024-07-23 PROCEDURE — 99213 OFFICE O/P EST LOW 20 MIN: CPT | Performed by: ORTHOPAEDIC SURGERY

## 2024-07-23 NOTE — PROGRESS NOTES
Emily, 7/23/24     Please note that this chart was generated using voice recognition Dragon dictation software.  Although every effort was made to ensure the accuracy of this automated transcription, some errors in transcription may have occurred.

## 2024-07-31 ENCOUNTER — HOSPITAL ENCOUNTER (OUTPATIENT)
Dept: PHYSICAL THERAPY | Age: 54
Setting detail: THERAPIES SERIES
Discharge: HOME | End: 2024-07-31
Payer: COMMERCIAL

## 2024-07-31 PROCEDURE — 97110 THERAPEUTIC EXERCISES: CPT

## 2024-07-31 NOTE — FLOWSHEET NOTE
physical therapy services in order to: decrease low back pain, improve lumbar range of motion. Hip strength, and functional ability so she can improve her activity tolerance for work and home.     Goals: (to be met in 6 treatments)  ? Pain: 3/10 or less low back pain to perform work duties as a   ? ROM: Lumber rotation and sidebend to the right to 75% to perform activity such as sweeping and cleaning.   ? Strength: Bilateral hip abduction to improve proximal hip stability with standing and walking activity  ? Function: Mod Oswestry to 25% impairment or less to improve ability with ADLs and IADLs  Independent with Home Exercise Programs  Patient will report being able to walk 30 min or greater to demonstrate improvement in activity tolerance           Patient goals: to get better    Pt. Education:  [x] Yes  [] No  [x] Reviewed Prior HEP/Ed  Method of Education: [x] Verbal  [x] Demo  [x] Written  Access Code: [email or web address]  URL: [email or web address]  Date: 07/03/2024  Prepared by: Jonn Blair     Exercises  - Hooklying Gluteal Sets  - 1 x daily - 7 x weekly - 1-3 sets - 10 reps  - Supine Lower Trunk Rotation  - 1 x daily - 7 x weekly - 1-3 sets - 10 reps  - Supine Transversus Abdominis Bracing - Hands on Stomach  - 1 x daily - 7 x weekly - 1-3 sets - 10 reps  - Single Knee to Chest Stretch  - 1 x daily - 7 x weekly - 1-3 sets - 10 reps    7/17/24  - Supine Hamstring Stretch with Strap  - 2-3 x daily - 7 x weekly - 3-5 reps - 15-30 hold  - Seated Hamstring Stretch  - 2-3 x daily - 7 x weekly - 3-5 reps - 15-30s hold  - Standing Hamstring Stretch on Chair  - 2-3 x daily - 7 x weekly - 3-5 reps - 15-30sec hold  - Supine Piriformis Stretch with Foot on Ground  - 2-3 x daily - 7 x weekly - 3-5 reps - 15-30sec hold  - Supine Single Knee to Chest Stretch  - 2-3 x daily - 7 x weekly - 3-5 reps - 15-30sec hold  - Supine March with Posterior Pelvic Tilt  - 2-3 x daily - 7 x weekly - 3 sets - 10 reps - 3-5sec

## 2024-08-15 ENCOUNTER — HOSPITAL ENCOUNTER (OUTPATIENT)
Dept: PHYSICAL THERAPY | Age: 54
Setting detail: THERAPIES SERIES
Discharge: HOME | End: 2024-08-15
Payer: COMMERCIAL

## 2024-08-15 PROCEDURE — 97110 THERAPEUTIC EXERCISES: CPT

## 2024-08-15 PROCEDURE — G0283 ELEC STIM OTHER THAN WOUND: HCPCS

## 2024-08-15 NOTE — FLOWSHEET NOTE
[facility]   Outpatient Rehabilitation & Therapy  [address]  P: [phone]   F: [phone]    Physical Therapy Daily Treatment Note      Date:  8/15/2024  Patient Name:  Florinda Fierro    :  1970  MRN: [id number]  Physician: Charles Coleman MD                                  Insurance: Medical mutual (60$ copay, NPRE, )  Medical Diagnosis:   M54.50, G89.29 (ICD-10-CM) - Chronic midline low back pain without sciatica   Rehab Codes: M54.50 Low back pain  Onset Date:                                  Next 's appt: 24  Visit# / total visits: 4/  Cancels/No Shows: 0/0    Subjective:  Patient arrived and states her pain levels have remained relatively unchanged since last session. Pain is radiating from lower back to (B) LE's L>R.     Pain:  [x] Yes  [] No Location: Low back Pain Rating: (0-10 scale) 7/10  Pain altered Tx:  [] No  [] Yes  Action:    Objective:  Modalities: IFC 'X' formation R side of low back 26.0mA 10 mins  Precautions:  Exercises: Supine program with CP 30 mins- held 8/15   Exercise Reps/ Time Weight/ Level Completed Comments   Glute squeeze 10x        LTR 10x  3\" x     TA activation  10x3\"        Piriformis stretch 3x 30\"  x 8/15 inc sets/ Reviewed for HEP   Hamstring stretch 3x 30\"  x 8/15 Reviewed for HEP   Modified Luis F 3x 30\" strap x  added   SKTC 3x30\"         PPT 10x 5\"                          Wall lumbar extension 10x 5\"  x 8/15 added for HEP   Physio ball roll out- fwd, R/L 10x 10\"    added                 Other:     Specific Instructions for next treatment:proximal hip strengthening, core activation      Assessment: [] Progressing toward goals.    [] No change.     [x] Other:  Continued with program as charted above.  Began with stretches with normal discomfort noted and followed with e-stim this session to help with pain management.  Patient noted soreness post e-stim.  Reviewed stretches that could be done in seated to help

## 2024-08-19 ENCOUNTER — HOSPITAL ENCOUNTER (OUTPATIENT)
Dept: PHYSICAL THERAPY | Age: 54
Setting detail: THERAPIES SERIES
Discharge: HOME | End: 2024-08-19
Payer: COMMERCIAL

## 2024-08-19 PROCEDURE — 97110 THERAPEUTIC EXERCISES: CPT

## 2024-08-19 NOTE — PROGRESS NOTES
Abd     4/5 4/5 Sidebend L 75% R50%p   Add     4/5 4/5 -------------------- --------- --------   Knee Flex     4+/5 4+/5         Ext      4+/5 4+/5            Functional Tests:  Mod oswestry: 21/50=42% impairment     Specific Instructions for next treatment:proximal hip strengthening, core activation      Assessment: [] Progressing toward goals.    [x] No change. Patient has attended 5 visits of skilled physical therapy with minimal improvement in symptoms. She reports having feeling better for a limited time after therapy. She continued to have pain on right side with hip strength testing. Functional ability has improved by 8%. Added standing exercises today to progress endurance. Patient demonstrates fatigue and spasm noted during last 2 exercises. Ended session with open books today to promote lumbar mobility. Discussed with patient on completing plan of care with PTA next week and then following up with physician in regards to continued pain.      [] Other:     [x] Patient would continue to benefit from skilled physical therapy services in order to: decrease low back pain, improve lumbar range of motion. Hip strength, and functional ability so she can improve her activity tolerance for work and home.     Goals: (to be met in 6 treatments)  ? Pain: 3/10 or less low back pain to perform work duties as a  Not met  ? ROM: Lumber rotation and sidebend to the right to 75% to perform activity such as sweeping and cleaning. Not met  ? Strength: Bilateral hip abduction to 4+/5 improve proximal hip stability with standing and walking activity Not met  ? Function: Mod Oswestry to 25% impairment or less to improve ability with ADLs and IADLs Not met  Independent with Home Exercise Programs MET  Patient will report being able to walk 30 min or greater to demonstrate improvement in activity tolerance Not met           Patient goals: to get better    Pt. Education:  [x] Yes  [] No  [x] Reviewed Prior

## 2024-09-03 ENCOUNTER — OFFICE VISIT (OUTPATIENT)
Dept: ORTHOPEDIC SURGERY | Age: 54
End: 2024-09-03
Payer: COMMERCIAL

## 2024-09-03 VITALS — WEIGHT: 240 LBS | HEIGHT: 61 IN | BODY MASS INDEX: 45.31 KG/M2 | RESPIRATION RATE: 14 BRPM

## 2024-09-03 DIAGNOSIS — M54.42 CHRONIC RIGHT-SIDED LOW BACK PAIN WITH LEFT-SIDED SCIATICA: Primary | ICD-10-CM

## 2024-09-03 DIAGNOSIS — G89.29 CHRONIC RIGHT-SIDED LOW BACK PAIN WITH LEFT-SIDED SCIATICA: Primary | ICD-10-CM

## 2024-09-03 PROCEDURE — G8417 CALC BMI ABV UP PARAM F/U: HCPCS | Performed by: ORTHOPAEDIC SURGERY

## 2024-09-03 PROCEDURE — 1036F TOBACCO NON-USER: CPT | Performed by: ORTHOPAEDIC SURGERY

## 2024-09-03 PROCEDURE — 3017F COLORECTAL CA SCREEN DOC REV: CPT | Performed by: ORTHOPAEDIC SURGERY

## 2024-09-03 PROCEDURE — G8428 CUR MEDS NOT DOCUMENT: HCPCS | Performed by: ORTHOPAEDIC SURGERY

## 2024-09-03 PROCEDURE — 99213 OFFICE O/P EST LOW 20 MIN: CPT | Performed by: ORTHOPAEDIC SURGERY

## 2024-09-03 NOTE — PROGRESS NOTES
Patient ID: Florinda Fierro is a 54 y.o. female    Chief Compliant:  No chief complaint on file.       Diagnostic imaging:    AP lateral lumbar spine again reviewed patient's x-ray is age-appropriate diffuse osteopenia some disc space collapse at 4 5 and 5 1    Assessment and Plan:  1. Chronic right-sided low back pain with left-sided sciatica        Chronic axial low back pain, patient reports her back pain is a bit more off to the right-hand side reports that she has left leg pain below the knee when she tries to lay on her left side    Patient has been doing some physical therapy although is very constrained secondary to the $60 co-pay she has been doing a home exercise program along with her physical therapy which she learned at physical therapy    MRI     Follow up after imaging    HPI:  This is a 54 y.o. female who presents to the clinic today for follow up of chronic axial low back pain.     Patient reports she cannot lie on her left side due to the pain    Patient does home exercises     Review of Systems   All other systems reviewed and are negative.      Past History:    Current Outpatient Medications:     amLODIPine (NORVASC) 10 MG tablet, Take 1 tablet by mouth daily, Disp: 90 tablet, Rfl: 1    atorvastatin (LIPITOR) 20 MG tablet, Take 1 tablet by mouth daily, Disp: 90 tablet, Rfl: 1    chlorzoxazone (PARAFON FORTE) 500 MG tablet, Take 1 tablet by mouth 3 times daily as needed for Muscle spasms, Disp: 270 tablet, Rfl: 1    diclofenac (VOLTAREN) 75 MG EC tablet, Take 1 tablet by mouth 2 times daily, Disp: 180 tablet, Rfl: 1    escitalopram (LEXAPRO) 10 MG tablet, Take 1 tablet by mouth daily, Disp: 90 tablet, Rfl: 2    gabapentin (NEURONTIN) 100 MG capsule, Take 1 capsule by mouth 3 times daily for 180 days., Disp: 270 capsule, Rfl: 1    lisinopril (PRINIVIL;ZESTRIL) 10 MG tablet, Take 1 tablet by mouth daily, Disp: 90 tablet, Rfl: 1    vitamin D (VITAMIN D3) 25 MCG (1000 UT) TABS tablet, Take 1 tablet by

## 2024-09-20 ENCOUNTER — HOSPITAL ENCOUNTER (OUTPATIENT)
Dept: MRI IMAGING | Age: 54
Discharge: HOME | End: 2024-09-22
Attending: ORTHOPAEDIC SURGERY
Payer: COMMERCIAL

## 2024-09-20 DIAGNOSIS — G89.29 CHRONIC RIGHT-SIDED LOW BACK PAIN WITH LEFT-SIDED SCIATICA: ICD-10-CM

## 2024-09-20 DIAGNOSIS — M54.42 CHRONIC RIGHT-SIDED LOW BACK PAIN WITH LEFT-SIDED SCIATICA: ICD-10-CM

## 2024-09-20 PROCEDURE — 72148 MRI LUMBAR SPINE W/O DYE: CPT

## 2024-10-01 ENCOUNTER — OFFICE VISIT (OUTPATIENT)
Dept: ORTHOPEDIC SURGERY | Age: 54
End: 2024-10-01
Payer: COMMERCIAL

## 2024-10-01 VITALS — RESPIRATION RATE: 14 BRPM | BODY MASS INDEX: 45.31 KG/M2 | WEIGHT: 240 LBS | HEIGHT: 61 IN

## 2024-10-01 DIAGNOSIS — G89.29 CHRONIC RIGHT-SIDED LOW BACK PAIN WITH LEFT-SIDED SCIATICA: Primary | ICD-10-CM

## 2024-10-01 DIAGNOSIS — M54.42 CHRONIC RIGHT-SIDED LOW BACK PAIN WITH LEFT-SIDED SCIATICA: Primary | ICD-10-CM

## 2024-10-01 PROCEDURE — 99213 OFFICE O/P EST LOW 20 MIN: CPT | Performed by: ORTHOPAEDIC SURGERY

## 2024-10-01 PROCEDURE — 3017F COLORECTAL CA SCREEN DOC REV: CPT | Performed by: ORTHOPAEDIC SURGERY

## 2024-10-01 PROCEDURE — G8428 CUR MEDS NOT DOCUMENT: HCPCS | Performed by: ORTHOPAEDIC SURGERY

## 2024-10-01 PROCEDURE — G8484 FLU IMMUNIZE NO ADMIN: HCPCS | Performed by: ORTHOPAEDIC SURGERY

## 2024-10-01 PROCEDURE — 1036F TOBACCO NON-USER: CPT | Performed by: ORTHOPAEDIC SURGERY

## 2024-10-01 PROCEDURE — G8417 CALC BMI ABV UP PARAM F/U: HCPCS | Performed by: ORTHOPAEDIC SURGERY

## 2024-10-01 NOTE — PROGRESS NOTES
documentation has been prepared under the direction and in the presence of Dr. Charles Coleman. Electronically signed: Emily Dale, 10/1/24     Please note that this chart was generated using voice recognition Dragon dictation software.  Although every effort was made to ensure the accuracy of this automated transcription, some errors in transcription may have occurred.

## 2024-10-30 ENCOUNTER — HOSPITAL ENCOUNTER (OUTPATIENT)
Dept: PAIN MANAGEMENT | Age: 54
Discharge: HOME | End: 2024-10-30
Payer: COMMERCIAL

## 2024-10-30 VITALS — BODY MASS INDEX: 43.99 KG/M2 | WEIGHT: 233 LBS | HEIGHT: 61 IN

## 2024-10-30 DIAGNOSIS — M47.817 LUMBOSACRAL SPONDYLOSIS WITHOUT MYELOPATHY: Primary | ICD-10-CM

## 2024-10-30 DIAGNOSIS — M51.369 DEGENERATION OF INTERVERTEBRAL DISC OF LUMBAR REGION, UNSPECIFIED WHETHER PAIN PRESENT: ICD-10-CM

## 2024-10-30 DIAGNOSIS — E66.813 CLASS 3 SEVERE OBESITY WITH BODY MASS INDEX (BMI) OF 40.0 TO 44.9 IN ADULT, UNSPECIFIED OBESITY TYPE, UNSPECIFIED WHETHER SERIOUS COMORBIDITY PRESENT: ICD-10-CM

## 2024-10-30 DIAGNOSIS — E66.01 CLASS 3 SEVERE OBESITY WITH BODY MASS INDEX (BMI) OF 40.0 TO 44.9 IN ADULT, UNSPECIFIED OBESITY TYPE, UNSPECIFIED WHETHER SERIOUS COMORBIDITY PRESENT: ICD-10-CM

## 2024-10-30 DIAGNOSIS — M79.18 MYOFASCIAL PAIN SYNDROME: ICD-10-CM

## 2024-10-30 PROCEDURE — 99203 OFFICE O/P NEW LOW 30 MIN: CPT

## 2024-10-30 PROCEDURE — 99204 OFFICE O/P NEW MOD 45 MIN: CPT | Performed by: STUDENT IN AN ORGANIZED HEALTH CARE EDUCATION/TRAINING PROGRAM

## 2024-10-30 ASSESSMENT — PAIN SCALES - GENERAL: PAINLEVEL_OUTOF10: 6

## 2024-10-30 NOTE — PROGRESS NOTES
Chronic Pain Clinic Note     Encounter Date: 10/30/2024     SUBJECTIVE:  Chief Complaint   Patient presents with    New Patient     Back pain w sciatica        History of Present Illness:   Florinda Fierro is a 54 y.o. female who presents with back pain     Medication Refill: n/a    Current Complaints of Pain:   Location: back   Radiation: None  Severity:  Moderate  Pain Numerical Score - 6 today    Average: 7/8     Highest: 10  Lowest: 4/5  Character/Quality: Complains of pain that is stabbing   Timing: Constant  Associated symptoms: none  Numbness: no  Weakness: no  Exacerbating factors: standing in one spot for too long   Alleviating factors: Tramadol, Voltaren   Length of time pain has been present: Started about 10 years ago  Inciting event/injury: no  Bowel/Bladder incontinence: no  Falls: none in the past month   Physical Therapy: yes, August 2024    History of Interventions:   Surgery: No previous lumbar/cervical surgeries  Injections: None    Imaging:    MRI Lumbar spine 9/20/24    Past Medical History:   Diagnosis Date    Anxiety     Cervical ca (HCC)     Hyperlipidemia     no meds    Hypertension        Past Surgical History:   Procedure Laterality Date    CHOLECYSTECTOMY      COLONOSCOPY      in her early 30's    COLONOSCOPY N/A 11/11/2019    COLORECTAL CANCER SCREENING, HIGH RISK performed by Rodolfo Terrell MD at [facility] ENDO    HYSTERECTOMY (CERVIX STATUS UNKNOWN)      SKIN GRAFT Right     rt middle finger    MGIUEL AND BSO (CERVIX REMOVED)  10/05/2016    TUBAL LIGATION         Family History   Problem Relation Age of Onset    High Blood Pressure Mother     Diabetes Mother     Other Mother         coronary thrombosis    Early Death Mother     Depression Mother     Diabetes Father     High Cholesterol Father     High Blood Pressure Father     Stroke Father     Diabetes Sister     High Blood Pressure Sister     High Blood Pressure Brother     Depression Maternal Grandmother     Cancer Maternal

## 2024-10-30 NOTE — H&P (VIEW-ONLY)
FACET JOINT L/S     FACET JOINT L/S 2ND LEVEL      2. Degeneration of intervertebral disc of lumbar region, unspecified whether pain present  M51.369       3. Myofascial pain syndrome  M79.18       4. Class 3 severe obesity with body mass index (BMI) of 40.0 to 44.9 in adult, unspecified obesity type, unspecified whether serious comorbidity present  E66.813     E66.01     Z68.41            ASSESSMENT:    Florinda Fierro is a 54 y.o.female who presents with chronic low back pain. To review, patient has had symptoms for the last year without recent injury or trauma.  She has not had lumbar spine surgery.  She was referred by orthopedic spine surgery for possible injections.    The patient's history and physical examination are consistent with lumbosacral spondylosis as the patient has axial low back pain that is mechanical in nature. There is tenderness to palpation along the lumbar paraspinal musculature with positive lumbar facet loading bilaterally.  The lumbar MRI on 9/20/2024 reveals multilevel degenerative changes with facet hypertrophy throughout the lumbar spine specifically at L4-5 and L5-S1 facet joints. I will plan for bilateral lumbar L3, L4, L5 medial branch blocks using fluoroscopy with progression to radiofrequency ablation if successful.      Neurologically, it appears the patient has full strength and normal sensation. There is no evidence of radiculopathy or myelopathy on examination. There are no red flags in the patient's history. The patient has failed conservative measures including outpatient physical therapy, greater than 3 medications for pain relief, a self-directed therapy program, as well as activity modification all within the last 6 weeks over 3 months. The patient's pain is moderate to severe that causes functional deficit measured on pain and disability scale. The patient reports worsening quality of life.    PLAN:  Medications:    For nonopioid therapy, the following medications were

## 2024-11-11 ENCOUNTER — CLINICAL DOCUMENTATION (OUTPATIENT)
Dept: PHYSICAL THERAPY | Age: 54
End: 2024-11-11

## 2024-11-11 NOTE — THERAPY DISCHARGE
[x] [facility] @ [facility]  Rehabilitation Services  [address]  [place], [place] [address]  Phone [phone]  Fax [phone]    Physical Therapy Discharge Note    Date: 2024      Patient: Florinda Fierro  : 1970  MRN: [id number]    Physician: Charles Coleman MD                                  Medical Diagnosis:   M54.50, G89.29 (ICD-10-CM) - Chronic midline low back pain without sciatica   Rehab Codes: M54.50 Low back pain  Onset Date:     Total visits attended:5  Cancels/No shows:1/3  Date of initial visit: 7/3/24                   [] Patient recovered from conditions. Treatment goals were met.  [] Patient received maximum benefit. No further therapy indicated at this time.  [] Patient demonstrated improvement from condition with  ** Of  ** Short term goals met.  []Patient demonstrated improvement from condition with **   Of **  Long term goals met.  [] Patient to continue exercise/home instructions independently.  [] Therapy interrupted due to:    [x] Patient has 2 or more no shows/cancels, is discontinued per our policy.    [] Patient has completed prescribed number of treatment sessions.    [x] Other:Patient did not attend final visit with PT and did not return call. Due to lapse in therapy Patient is to be discharged at this time. If patient is to require more physical therapy services, they are to obtain new script from physician.         Treatment Included:     [x] Therapeutic Exercise   97938  [] Iontophoresis: 4 mg/mL Dexamethasone Sodium Phosphate  mAmin  71801   [x] Therapeutic Activity  01162 [] Vasopneumatic cold with compression  84910    [] Gait Training   48523 [] Ultrasound   35889   [x] Neuromuscular Re-education  67175 [x] Electrical Stimulation Unattended  64367   [x] Manual Therapy  70239 [] Electrical Stimulation Attended  68054   [x] Instruction in HEP  [] Lumbar/Cervical Traction  05511   [] Aquatic Therapy   23496 [x] Cold/hotpack

## 2024-11-18 ENCOUNTER — HOSPITAL ENCOUNTER (OUTPATIENT)
Dept: PAIN MANAGEMENT | Facility: CLINIC | Age: 54
Discharge: HOME | End: 2024-11-18
Payer: COMMERCIAL

## 2024-11-18 VITALS
OXYGEN SATURATION: 95 % | SYSTOLIC BLOOD PRESSURE: 116 MMHG | DIASTOLIC BLOOD PRESSURE: 55 MMHG | HEIGHT: 61 IN | WEIGHT: 233 LBS | RESPIRATION RATE: 12 BRPM | TEMPERATURE: 98 F | HEART RATE: 82 BPM | BODY MASS INDEX: 43.99 KG/M2

## 2024-11-18 DIAGNOSIS — R52 PAIN MANAGEMENT: ICD-10-CM

## 2024-11-18 PROCEDURE — 64494 INJ PARAVERT F JNT L/S 2 LEV: CPT | Performed by: STUDENT IN AN ORGANIZED HEALTH CARE EDUCATION/TRAINING PROGRAM

## 2024-11-18 PROCEDURE — 6360000002 HC RX W HCPCS: Performed by: STUDENT IN AN ORGANIZED HEALTH CARE EDUCATION/TRAINING PROGRAM

## 2024-11-18 PROCEDURE — 64494 INJ PARAVERT F JNT L/S 2 LEV: CPT

## 2024-11-18 PROCEDURE — 64493 INJ PARAVERT F JNT L/S 1 LEV: CPT | Performed by: STUDENT IN AN ORGANIZED HEALTH CARE EDUCATION/TRAINING PROGRAM

## 2024-11-18 PROCEDURE — 2500000003 HC RX 250 WO HCPCS: Performed by: STUDENT IN AN ORGANIZED HEALTH CARE EDUCATION/TRAINING PROGRAM

## 2024-11-18 PROCEDURE — 99152 MOD SED SAME PHYS/QHP 5/>YRS: CPT | Performed by: STUDENT IN AN ORGANIZED HEALTH CARE EDUCATION/TRAINING PROGRAM

## 2024-11-18 PROCEDURE — 64493 INJ PARAVERT F JNT L/S 1 LEV: CPT

## 2024-11-18 RX ORDER — MIDAZOLAM HYDROCHLORIDE 2 MG/2ML
INJECTION, SOLUTION INTRAMUSCULAR; INTRAVENOUS
Status: COMPLETED | OUTPATIENT
Start: 2024-11-18 | End: 2024-11-18

## 2024-11-18 RX ORDER — LIDOCAINE HYDROCHLORIDE 20 MG/ML
INJECTION, SOLUTION EPIDURAL; INFILTRATION; INTRACAUDAL; PERINEURAL
Status: COMPLETED | OUTPATIENT
Start: 2024-11-18 | End: 2024-11-18

## 2024-11-18 RX ADMIN — MIDAZOLAM HYDROCHLORIDE 2 MG: 1 INJECTION, SOLUTION INTRAMUSCULAR; INTRAVENOUS at 12:34

## 2024-11-18 RX ADMIN — LIDOCAINE HYDROCHLORIDE 5 ML: 20 INJECTION, SOLUTION EPIDURAL; INFILTRATION; INTRACAUDAL; PERINEURAL at 12:36

## 2024-11-18 ASSESSMENT — PAIN - FUNCTIONAL ASSESSMENT
PAIN_FUNCTIONAL_ASSESSMENT: NONE - DENIES PAIN
PAIN_FUNCTIONAL_ASSESSMENT: PREVENTS OR INTERFERES SOME ACTIVE ACTIVITIES AND ADLS
PAIN_FUNCTIONAL_ASSESSMENT: 0-10

## 2024-11-18 ASSESSMENT — PAIN DESCRIPTION - DESCRIPTORS: DESCRIPTORS: SHARP;STABBING

## 2024-11-18 NOTE — DISCHARGE INSTRUCTIONS
You have received a sedative/anesthetic therefore you should not consume any alcoholic beverages for 24 hours.  Do not drive or operate machinery for 24 hours.    Do not take a tub bath for 72 hours after procedure (this includes hot tubs).  You may shower, but avoid hot water to injection site.   Avoid strenuous activity TODAY especially if you experience dizziness.   Remove band-aid the next day.    Wash off any residual iodine 24 hours from today.   Do not use heat, heating pad, or any other heating device over the injection site for 3 days after the procedure.    If you experience pain after your procedure, you may continue with your current pain medication as prescribed.  (DO NOT INCREASE YOUR PAIN MEDICATION WITHOUT TALKING TO DOCTOR)  Soreness and pain at injection site is common, may use ice to reduce soreness.    Please complete pain diary as instructed. Office staff will contact you to review results.    Call [facility] at [phone] if you experience:   Fever, chills or temperature over 100    Vomiting, headache, persistent stiff neck, nausea or blurred vision   Difficulty urinating or unable to urinate within 8 hours   Increase in weakness, numbness or loss of function of limbs  Increased redness, swelling or drainage at the injection site

## 2024-11-18 NOTE — OP NOTE
PROCEDURE PERFORMED: Bilateral Lumbar medial branch block    PREOPERATIVE DIAGNOSIS: Lumbosacral spondylosis    INDICATIONS: Chronic low back pain    The patient's history and physical exam were reviewed.  The risk, benefits, and alternatives of the procedure were discussed and all questions were answered to the patient's satisfaction.  The patient agreed to proceed and written informed consent was obtained.    POSTOPERATIVE DIAGNOSIS: Lumbar spondylosis    PHYSICIAN:  Dr. Man Hdz DO    ANESTHESIA:  LOCAL    ASSISTANT:  NONE    PATHOLOGY:  NONE    ESTIMATED BLOOD LOSS:  N/A    IMPLANTS:  NONE    PROCEDURE DESCRIPTION: Diagnostic bilateral lumbar medial branch block using fluoroscopy    The patient was placed on the operative bed in prone position.  The area was prepped with  Chlorhexidine.  The area was then draped in a sterile fashion.    Targeted levels: Bilateral lumbar L3, L4, L5 medial branch block    An AP  fluoroscopy image was used to identify and gildardo Luo's point at the L3, L4 levels on the targeted side.  Additionally, the junction of the SAP and sacral ala was also marked on the same side.   A 25-gauge 3-1/2 inch Quincke spinal needle was then advanced toward each of these points under fluoroscopic guidance.  Once bone was contacted, negative aspiration was confirmed and 0.5 mL of lidocaine 2% was injected each level.  The needles were removed and the needle sites were dressed appropriately. The same procedure was performed on the opposite side.    The patient was transferred to the postoperative care unit in stable condition.  Written discharge instructions were given to the patient.  The patient was given a pain diary upon discharge.    COMPLICATIONS:  There were no apparent complications.  The patient tolerated the procedure well.     SEDATION NOTE:     ASA CLASSIFICATION  2  MP   CLASSIFICATION  2     Moderate intravenous conscious sedation was supervised by Dr. Hdz  The patient

## 2024-11-18 NOTE — INTERVAL H&P NOTE
Update History & Physical    The patient's History and Physical of October 30, 2024 was reviewed with the patient and I examined the patient. There was no change. The surgical site was confirmed by the patient and me.     Plan: The risks, benefits, expected outcome, and alternative to the recommended procedure have been discussed with the patient. Patient understands and wants to proceed with the procedure.     ASA CLASSIFICATION  2  MP   CLASSIFICATION  2    Electronically signed by Man Hdz DO on 11/18/2024 at 12:00 PM

## 2024-11-19 ENCOUNTER — TELEPHONE (OUTPATIENT)
Dept: PAIN MANAGEMENT | Age: 54
End: 2024-11-19

## 2024-11-20 NOTE — TELEPHONE ENCOUNTER
S/P: Bilateral lumbar L3, L4, L5 medial branch block     DOS: 11/18/2024    Pain  before procedure with activity : 9/10    Pain after procedure with activity: 0    Activities following procedure include: did dishes and laundry    % of pain relief: 100%    Procedure successful: Yes    OV or OR scheduled: 12/03/2024

## 2024-12-03 ENCOUNTER — HOSPITAL ENCOUNTER (OUTPATIENT)
Dept: PAIN MANAGEMENT | Facility: CLINIC | Age: 54
Discharge: HOME | End: 2024-12-03

## 2024-12-03 NOTE — DISCHARGE INSTRUCTIONS
You have received a sedative/anesthetic therefore you should not consume any alcoholic beverages for 24 hours.  Do not drive or operate machinery for 24 hours.    Do not take a tub bath for 72 hours after procedure (this includes hot tubs).  You may shower, but avoid hot water to injection site.   Avoid strenuous activity TODAY especially if you experience dizziness.   Remove band-aid the next day.    Wash off any residual iodine 24 hours from today.   Do not use heat, heating pad, or any other heating device over the injection site for 3 days after the procedure.    If you experience pain after your procedure, you may continue with your current pain medication as prescribed.  (DO NOT INCREASE YOUR PAIN MEDICATION WITHOUT TALKING TO DOCTOR)  Soreness and pain at injection site is common, may use ice to reduce soreness.    Please complete pain diary as instructed. Office staff will contact you to review results.    Call [facility] at [phone] if you experience:   Fever, chills or temperature over 100    Vomiting, headache, persistent stiff neck, nausea or blurred vision   Difficulty urinating or unable to urinate within 8 hours   Increase in weakness, numbness or loss of function of limbs  Increased redness, swelling or drainage at the injection site

## 2025-01-15 ENCOUNTER — TELEPHONE (OUTPATIENT)
Dept: PAIN MANAGEMENT | Age: 55
End: 2025-01-15

## 2025-01-15 NOTE — TELEPHONE ENCOUNTER
Upon calling pt she states she just needs to r/s her MBB #2 appt. She had to cancel on 12/3 due to family reasons. Ok to r/s or does she need an OV?

## 2025-02-11 ENCOUNTER — HOSPITAL ENCOUNTER (OUTPATIENT)
Dept: PAIN MANAGEMENT | Facility: CLINIC | Age: 55
Discharge: HOME | End: 2025-02-11
Payer: COMMERCIAL

## 2025-02-11 VITALS
HEIGHT: 61 IN | BODY MASS INDEX: 44.37 KG/M2 | RESPIRATION RATE: 20 BRPM | WEIGHT: 235 LBS | TEMPERATURE: 97.8 F | DIASTOLIC BLOOD PRESSURE: 59 MMHG | HEART RATE: 82 BPM | SYSTOLIC BLOOD PRESSURE: 113 MMHG | OXYGEN SATURATION: 95 %

## 2025-02-11 DIAGNOSIS — R52 PAIN MANAGEMENT: ICD-10-CM

## 2025-02-11 PROCEDURE — 64494 INJ PARAVERT F JNT L/S 2 LEV: CPT

## 2025-02-11 PROCEDURE — 99152 MOD SED SAME PHYS/QHP 5/>YRS: CPT | Performed by: STUDENT IN AN ORGANIZED HEALTH CARE EDUCATION/TRAINING PROGRAM

## 2025-02-11 PROCEDURE — 64493 INJ PARAVERT F JNT L/S 1 LEV: CPT | Performed by: STUDENT IN AN ORGANIZED HEALTH CARE EDUCATION/TRAINING PROGRAM

## 2025-02-11 PROCEDURE — 64493 INJ PARAVERT F JNT L/S 1 LEV: CPT

## 2025-02-11 PROCEDURE — 6360000002 HC RX W HCPCS: Performed by: STUDENT IN AN ORGANIZED HEALTH CARE EDUCATION/TRAINING PROGRAM

## 2025-02-11 PROCEDURE — 64494 INJ PARAVERT F JNT L/S 2 LEV: CPT | Performed by: STUDENT IN AN ORGANIZED HEALTH CARE EDUCATION/TRAINING PROGRAM

## 2025-02-11 RX ORDER — TRAMADOL HYDROCHLORIDE 50 MG/1
50 TABLET ORAL DAILY PRN
COMMUNITY

## 2025-02-11 RX ORDER — LIDOCAINE HYDROCHLORIDE 20 MG/ML
INJECTION, SOLUTION EPIDURAL; INFILTRATION; INTRACAUDAL; PERINEURAL
Status: COMPLETED | OUTPATIENT
Start: 2025-02-11 | End: 2025-02-11

## 2025-02-11 RX ORDER — MIDAZOLAM HYDROCHLORIDE 2 MG/2ML
INJECTION, SOLUTION INTRAMUSCULAR; INTRAVENOUS
Status: COMPLETED | OUTPATIENT
Start: 2025-02-11 | End: 2025-02-11

## 2025-02-11 RX ADMIN — LIDOCAINE HYDROCHLORIDE 5 ML: 20 INJECTION, SOLUTION EPIDURAL; INFILTRATION; INTRACAUDAL; PERINEURAL at 15:33

## 2025-02-11 RX ADMIN — MIDAZOLAM HYDROCHLORIDE 2 MG: 1 INJECTION, SOLUTION INTRAMUSCULAR; INTRAVENOUS at 15:32

## 2025-02-11 ASSESSMENT — PAIN - FUNCTIONAL ASSESSMENT
PAIN_FUNCTIONAL_ASSESSMENT: 0-10
PAIN_FUNCTIONAL_ASSESSMENT: 0-10
PAIN_FUNCTIONAL_ASSESSMENT: PREVENTS OR INTERFERES SOME ACTIVE ACTIVITIES AND ADLS

## 2025-02-11 ASSESSMENT — PAIN DESCRIPTION - DESCRIPTORS: DESCRIPTORS: SHARP

## 2025-02-11 NOTE — H&P
Pain Pre-Op H&P Note    SUBJECTIVE:  No chief complaint on file.      History of Present Illness:   Florinda Fierro is a 54 y.o. female who presents with chronic low back pain.    Past Medical History:   Diagnosis Date    Anxiety     Cervical ca (HCC)     Hyperlipidemia     no meds    Hypertension        Past Surgical History:   Procedure Laterality Date    CHOLECYSTECTOMY      COLONOSCOPY      in her early 30's    COLONOSCOPY N/A 11/11/2019    COLORECTAL CANCER SCREENING, HIGH RISK performed by Rodolfo Terrell MD at [facility] ENDO    HYSTERECTOMY (CERVIX STATUS UNKNOWN)      SKIN GRAFT Right     rt middle finger    MIGUEL AND BSO (CERVIX REMOVED)  10/05/2016    TUBAL LIGATION         Family History   Problem Relation Age of Onset    High Blood Pressure Mother     Diabetes Mother     Other Mother         coronary thrombosis    Early Death Mother     Depression Mother     Diabetes Father     High Cholesterol Father     High Blood Pressure Father     Stroke Father     Diabetes Sister     High Blood Pressure Sister     High Blood Pressure Brother     Depression Maternal Grandmother     Cancer Maternal Grandfather         leukemia    Heart Disease Maternal Grandfather         MI    High Blood Pressure Sister     Mental Illness Brother         anxiety       Social History     Socioeconomic History    Marital status: Single     Spouse name: Not on file    Number of children: Not on file    Years of education: Not on file    Highest education level: Not on file   Occupational History    Not on file   Tobacco Use    Smoking status: Never    Smokeless tobacco: Never   Vaping Use    Vaping status: Never Used   Substance and Sexual Activity    Alcohol use: No     Alcohol/week: 0.0 standard drinks of alcohol    Drug use: No    Sexual activity: Not on file   Other Topics Concern    Not on file   Social History Narrative    Not on file     Social Determinants of Health     Financial Resource Strain: Low Risk  (4/22/2024)    Overall

## 2025-02-11 NOTE — DISCHARGE INSTRUCTIONS
You have received a sedative/anesthetic therefore you should not consume any alcoholic beverages for 24 hours.  Do not drive or operate machinery for 24 hours.    Do not take a tub bath for 72 hours after procedure (this includes hot tubs).  You may shower, but avoid hot water to injection site.   Avoid strenuous activity TODAY especially if you experience dizziness.   Remove band-aid the next day.    Wash off any residual iodine 24 hours from today.   Do not use heat, heating pad, or any other heating device over the injection site for 3 days after the procedure.    If you experience pain after your procedure, you may continue with your current pain medication as prescribed.  (DO NOT INCREASE YOUR PAIN MEDICATION WITHOUT TALKING TO DOCTOR)  Soreness and pain at injection site is common, may use ice to reduce soreness.    Please complete pain diary as instructed. Office staff will contact you to review results.    Call [facility] at [phone] if you experience:   Fever, chills or temperature over 100    Vomiting, headache, persistent stiff neck, nausea or blurred vision   Difficulty urinating or unable to urinate within 8 hours   Increase in weakness, numbness or loss of function of limbs  Increased redness, swelling or drainage at the injection site

## 2025-03-11 ENCOUNTER — TELEPHONE (OUTPATIENT)
Dept: PAIN MANAGEMENT | Age: 55
End: 2025-03-11

## 2025-03-11 DIAGNOSIS — M54.50 CHRONIC BILATERAL LOW BACK PAIN, UNSPECIFIED WHETHER SCIATICA PRESENT: ICD-10-CM

## 2025-03-11 DIAGNOSIS — M47.817 LUMBOSACRAL SPONDYLOSIS WITHOUT MYELOPATHY: Primary | ICD-10-CM

## 2025-03-11 DIAGNOSIS — G89.29 CHRONIC BILATERAL LOW BACK PAIN, UNSPECIFIED WHETHER SCIATICA PRESENT: ICD-10-CM

## 2025-03-11 NOTE — TELEPHONE ENCOUNTER
S/P: Bilateral lumbar L3, L4, L5 medial branch block   DOS:  02/11/2025    Pain  before procedure with activity : 10    Pain after procedure with activity: 0    Activities following procedure include: dishes, laundry, took dog for a walk    % of pain relief: 100%    Procedure successful: Yes    OV or OR scheduled: OR

## 2025-03-12 ENCOUNTER — TELEPHONE (OUTPATIENT)
Dept: PAIN MANAGEMENT | Age: 55
End: 2025-03-12

## 2025-03-12 NOTE — TELEPHONE ENCOUNTER
Florinda called with question on her next step, patient left message on procedure voice mail please advise.

## 2025-04-01 ENCOUNTER — HOSPITAL ENCOUNTER (OUTPATIENT)
Dept: PAIN MANAGEMENT | Facility: CLINIC | Age: 55
Discharge: HOME | End: 2025-04-01

## 2025-04-01 NOTE — DISCHARGE INSTRUCTIONS

## 2025-04-01 NOTE — FLOWSHEET NOTE
Patient did not show for scheduled procedure. Multiple attempts made to reach patient by phone. No answer.

## 2025-04-29 ENCOUNTER — HOSPITAL ENCOUNTER (OUTPATIENT)
Dept: PAIN MANAGEMENT | Facility: CLINIC | Age: 55
Discharge: HOME | End: 2025-04-29
Payer: COMMERCIAL

## 2025-04-29 VITALS
DIASTOLIC BLOOD PRESSURE: 73 MMHG | WEIGHT: 235 LBS | SYSTOLIC BLOOD PRESSURE: 120 MMHG | HEART RATE: 81 BPM | BODY MASS INDEX: 44.37 KG/M2 | OXYGEN SATURATION: 95 % | HEIGHT: 61 IN | TEMPERATURE: 97.1 F | RESPIRATION RATE: 12 BRPM

## 2025-04-29 DIAGNOSIS — R52 PAIN MANAGEMENT: ICD-10-CM

## 2025-04-29 PROCEDURE — 6360000002 HC RX W HCPCS: Performed by: STUDENT IN AN ORGANIZED HEALTH CARE EDUCATION/TRAINING PROGRAM

## 2025-04-29 PROCEDURE — 99152 MOD SED SAME PHYS/QHP 5/>YRS: CPT | Performed by: STUDENT IN AN ORGANIZED HEALTH CARE EDUCATION/TRAINING PROGRAM

## 2025-04-29 PROCEDURE — 64635 DESTROY LUMB/SAC FACET JNT: CPT

## 2025-04-29 PROCEDURE — 64636 DESTROY L/S FACET JNT ADDL: CPT

## 2025-04-29 PROCEDURE — 64635 DESTROY LUMB/SAC FACET JNT: CPT | Performed by: STUDENT IN AN ORGANIZED HEALTH CARE EDUCATION/TRAINING PROGRAM

## 2025-04-29 PROCEDURE — 64636 DESTROY L/S FACET JNT ADDL: CPT | Performed by: STUDENT IN AN ORGANIZED HEALTH CARE EDUCATION/TRAINING PROGRAM

## 2025-04-29 RX ORDER — MIDAZOLAM HYDROCHLORIDE 2 MG/2ML
INJECTION, SOLUTION INTRAMUSCULAR; INTRAVENOUS
Status: COMPLETED | OUTPATIENT
Start: 2025-04-29 | End: 2025-04-29

## 2025-04-29 RX ORDER — LIDOCAINE HYDROCHLORIDE 20 MG/ML
INJECTION, SOLUTION EPIDURAL; INFILTRATION; INTRACAUDAL; PERINEURAL
Status: COMPLETED | OUTPATIENT
Start: 2025-04-29 | End: 2025-04-29

## 2025-04-29 RX ORDER — TRIAMCINOLONE ACETONIDE 40 MG/ML
INJECTION, SUSPENSION INTRA-ARTICULAR; INTRAMUSCULAR
Status: COMPLETED | OUTPATIENT
Start: 2025-04-29 | End: 2025-04-29

## 2025-04-29 RX ORDER — LIDOCAINE HYDROCHLORIDE 10 MG/ML
INJECTION, SOLUTION EPIDURAL; INFILTRATION; INTRACAUDAL; PERINEURAL
Status: COMPLETED | OUTPATIENT
Start: 2025-04-29 | End: 2025-04-29

## 2025-04-29 RX ADMIN — LIDOCAINE HYDROCHLORIDE 2 ML: 10 INJECTION, SOLUTION EPIDURAL; INFILTRATION; INTRACAUDAL; PERINEURAL at 12:39

## 2025-04-29 RX ADMIN — MIDAZOLAM HYDROCHLORIDE 2 MG: 1 INJECTION, SOLUTION INTRAMUSCULAR; INTRAVENOUS at 12:31

## 2025-04-29 RX ADMIN — LIDOCAINE HYDROCHLORIDE 5 ML: 20 INJECTION, SOLUTION EPIDURAL; INFILTRATION; INTRACAUDAL; PERINEURAL at 12:35

## 2025-04-29 RX ADMIN — TRIAMCINOLONE ACETONIDE 40 MG: 40 INJECTION, SUSPENSION INTRA-ARTICULAR; INTRAMUSCULAR at 12:39

## 2025-04-29 ASSESSMENT — PAIN DESCRIPTION - DESCRIPTORS: DESCRIPTORS: STABBING

## 2025-04-29 ASSESSMENT — PAIN - FUNCTIONAL ASSESSMENT
PAIN_FUNCTIONAL_ASSESSMENT: PREVENTS OR INTERFERES SOME ACTIVE ACTIVITIES AND ADLS
PAIN_FUNCTIONAL_ASSESSMENT: NONE - DENIES PAIN
PAIN_FUNCTIONAL_ASSESSMENT: 0-10

## 2025-04-29 NOTE — OP NOTE
PROCEDURE PERFORMED: Right Lumbar Medial Branch Radiofrequency Ablation using Fluoroscopy    PREOPERATIVE DIAGNOSIS: Lumbosacral spondylosis    INDICATIONS: Chronic low back pain    The patient's history and physical exam were reviewed.  The risk, benefits, and alternatives of the procedure were discussed and all questions were answered to the patient's satisfaction.  The patient agreed to proceed and written informed consent was obtained.    POSTOPERATIVE DIAGNOSIS: Lumbosacral spondylosis    PHYSICIAN:  Dr. Man Hdz DO    ANESTHESIA:  LOCAL    ASSISTANT:  NONE    PATHOLOGY:  NONE    ESTIMATED BLOOD LOSS:  N/A    IMPLANTS:  NONE    PROCEDURE DESCRIPTION: Right lumbar medial branch radiofrequency ablation using fluoroscopy    The patient was placed on the operative bed in prone position.  The area was prepped with  Chlorhexidine.  The area was then draped in a sterile fashion.    Targeted levels: Right lumbar L3, L4, L5 medial branch radiofrequency ablation    An AP  fluoroscopy image was used to identify and gildardo Luo's point at the L3, L4 levels on the targeted side.  Additionally, the junction of the SAP and sacral ala was also marked on the same side.  The skin and subcutaneous tissues were then anesthetized with 1% lidocaine. At each lumbar medial branch, a 20-gauge, 100 mm curved with 10 mm active tip probe was advanced under AP fluoroscopic projections until bone was contacted along the medial aspect of the transverse process.  Aspiration of each needle was negative for blood, CSF and paresthesia prior to injection.   Motor stimulation at 2 Hz and 1.2 V was negative.  Then, after negative aspiration, 1 mL lidocaine 2% was injected at each level prior to lesioning which was performed at 80°C for 90 seconds.  Once the lesion was complete, injectate solution containing Kenalog 40 mg and 2 mL lidocaine 1% was injected at each site with a total of 1 mL.  The probes were then removed.  The needle

## 2025-04-29 NOTE — DISCHARGE INSTRUCTIONS

## 2025-04-29 NOTE — H&P
Pain Pre-Op H&P Note    SUBJECTIVE:  No chief complaint on file.      History of Present Illness:   Florinda Fierro is a 54 y.o. female who presents with chronic low back pain.    Past Medical History:   Diagnosis Date    Anxiety     Cervical ca (HCC)     Hyperlipidemia     no meds    Hypertension        Past Surgical History:   Procedure Laterality Date    CHOLECYSTECTOMY      COLONOSCOPY      in her early 30's    COLONOSCOPY N/A 11/11/2019    COLORECTAL CANCER SCREENING, HIGH RISK performed by Rodolfo Terrell MD at [facility] ENDO    HYSTERECTOMY (CERVIX STATUS UNKNOWN)      SKIN GRAFT Right     rt middle finger    MIGUEL AND BSO (CERVIX REMOVED)  10/05/2016    TUBAL LIGATION         Family History   Problem Relation Age of Onset    High Blood Pressure Mother     Diabetes Mother     Other Mother         coronary thrombosis    Early Death Mother     Depression Mother     Diabetes Father     High Cholesterol Father     High Blood Pressure Father     Stroke Father     Diabetes Sister     High Blood Pressure Sister     High Blood Pressure Brother     Depression Maternal Grandmother     Cancer Maternal Grandfather         leukemia    Heart Disease Maternal Grandfather         MI    High Blood Pressure Sister     Mental Illness Brother         anxiety       Social History     Socioeconomic History    Marital status: Single     Spouse name: Not on file    Number of children: Not on file    Years of education: Not on file    Highest education level: Not on file   Occupational History    Not on file   Tobacco Use    Smoking status: Never    Smokeless tobacco: Never   Vaping Use    Vaping status: Never Used   Substance and Sexual Activity    Alcohol use: No     Alcohol/week: 0.0 standard drinks of alcohol    Drug use: No    Sexual activity: Not on file   Other Topics Concern    Not on file   Social History Narrative    Not on file     Social Drivers of Health     Financial Resource Strain: Low Risk  (4/22/2024)    Overall

## 2025-05-13 ENCOUNTER — HOSPITAL ENCOUNTER (OUTPATIENT)
Dept: PAIN MANAGEMENT | Facility: CLINIC | Age: 55
Discharge: HOME | End: 2025-05-13
Payer: COMMERCIAL

## 2025-05-13 VITALS
DIASTOLIC BLOOD PRESSURE: 58 MMHG | SYSTOLIC BLOOD PRESSURE: 115 MMHG | OXYGEN SATURATION: 96 % | TEMPERATURE: 97.2 F | RESPIRATION RATE: 12 BRPM | HEIGHT: 61 IN | HEART RATE: 74 BPM | WEIGHT: 235 LBS | BODY MASS INDEX: 44.37 KG/M2

## 2025-05-13 DIAGNOSIS — R52 PAIN MANAGEMENT: ICD-10-CM

## 2025-05-13 PROCEDURE — 64635 DESTROY LUMB/SAC FACET JNT: CPT | Performed by: STUDENT IN AN ORGANIZED HEALTH CARE EDUCATION/TRAINING PROGRAM

## 2025-05-13 PROCEDURE — 99152 MOD SED SAME PHYS/QHP 5/>YRS: CPT | Performed by: STUDENT IN AN ORGANIZED HEALTH CARE EDUCATION/TRAINING PROGRAM

## 2025-05-13 PROCEDURE — 64636 DESTROY L/S FACET JNT ADDL: CPT | Performed by: STUDENT IN AN ORGANIZED HEALTH CARE EDUCATION/TRAINING PROGRAM

## 2025-05-13 PROCEDURE — 6360000002 HC RX W HCPCS: Performed by: STUDENT IN AN ORGANIZED HEALTH CARE EDUCATION/TRAINING PROGRAM

## 2025-05-13 PROCEDURE — 64635 DESTROY LUMB/SAC FACET JNT: CPT

## 2025-05-13 PROCEDURE — 64636 DESTROY L/S FACET JNT ADDL: CPT

## 2025-05-13 RX ORDER — LIDOCAINE HYDROCHLORIDE 20 MG/ML
INJECTION, SOLUTION EPIDURAL; INFILTRATION; INTRACAUDAL; PERINEURAL
Status: COMPLETED | OUTPATIENT
Start: 2025-05-13 | End: 2025-05-13

## 2025-05-13 RX ORDER — TRIAMCINOLONE ACETONIDE 40 MG/ML
INJECTION, SUSPENSION INTRA-ARTICULAR; INTRAMUSCULAR
Status: COMPLETED | OUTPATIENT
Start: 2025-05-13 | End: 2025-05-13

## 2025-05-13 RX ORDER — MIDAZOLAM HYDROCHLORIDE 2 MG/2ML
INJECTION, SOLUTION INTRAMUSCULAR; INTRAVENOUS
Status: COMPLETED | OUTPATIENT
Start: 2025-05-13 | End: 2025-05-13

## 2025-05-13 RX ORDER — LIDOCAINE HYDROCHLORIDE 10 MG/ML
INJECTION, SOLUTION EPIDURAL; INFILTRATION; INTRACAUDAL; PERINEURAL
Status: COMPLETED | OUTPATIENT
Start: 2025-05-13 | End: 2025-05-13

## 2025-05-13 RX ADMIN — TRIAMCINOLONE ACETONIDE 40 MG: 40 INJECTION, SUSPENSION INTRA-ARTICULAR; INTRAMUSCULAR at 13:35

## 2025-05-13 RX ADMIN — MIDAZOLAM HYDROCHLORIDE 2 MG: 1 INJECTION, SOLUTION INTRAMUSCULAR; INTRAVENOUS at 13:28

## 2025-05-13 RX ADMIN — LIDOCAINE HYDROCHLORIDE 5 ML: 20 INJECTION, SOLUTION EPIDURAL; INFILTRATION; INTRACAUDAL; PERINEURAL at 13:31

## 2025-05-13 RX ADMIN — LIDOCAINE HYDROCHLORIDE 2 ML: 10 INJECTION, SOLUTION EPIDURAL; INFILTRATION; INTRACAUDAL; PERINEURAL at 13:35

## 2025-05-13 ASSESSMENT — PAIN DESCRIPTION - DESCRIPTORS: DESCRIPTORS: BURNING;STABBING

## 2025-05-13 ASSESSMENT — PAIN - FUNCTIONAL ASSESSMENT
PAIN_FUNCTIONAL_ASSESSMENT: 0-10
PAIN_FUNCTIONAL_ASSESSMENT: PREVENTS OR INTERFERES SOME ACTIVE ACTIVITIES AND ADLS
PAIN_FUNCTIONAL_ASSESSMENT: NONE - DENIES PAIN

## 2025-05-13 NOTE — H&P
Pain Pre-Op H&P Note    SUBJECTIVE:  No chief complaint on file.      History of Present Illness:   Florinda Fierro is a 54 y.o. female who presents with chronic low back pain.    Past Medical History:   Diagnosis Date    Anxiety     Cervical ca (HCC)     Hyperlipidemia     no meds    Hypertension        Past Surgical History:   Procedure Laterality Date    CHOLECYSTECTOMY      COLONOSCOPY      in her early 30's    COLONOSCOPY N/A 11/11/2019    COLORECTAL CANCER SCREENING, HIGH RISK performed by Rodolfo Terrell MD at [facility] ENDO    HYSTERECTOMY (CERVIX STATUS UNKNOWN)      SKIN GRAFT Right     rt middle finger    MIGUEL AND BSO (CERVIX REMOVED)  10/05/2016    TUBAL LIGATION         Family History   Problem Relation Age of Onset    High Blood Pressure Mother     Diabetes Mother     Other Mother         coronary thrombosis    Early Death Mother     Depression Mother     Diabetes Father     High Cholesterol Father     High Blood Pressure Father     Stroke Father     Diabetes Sister     High Blood Pressure Sister     High Blood Pressure Brother     Depression Maternal Grandmother     Cancer Maternal Grandfather         leukemia    Heart Disease Maternal Grandfather         MI    High Blood Pressure Sister     Mental Illness Brother         anxiety       Social History     Socioeconomic History    Marital status: Single     Spouse name: Not on file    Number of children: Not on file    Years of education: Not on file    Highest education level: Not on file   Occupational History    Not on file   Tobacco Use    Smoking status: Never    Smokeless tobacco: Never   Vaping Use    Vaping status: Never Used   Substance and Sexual Activity    Alcohol use: No     Alcohol/week: 0.0 standard drinks of alcohol    Drug use: No    Sexual activity: Not on file   Other Topics Concern    Not on file   Social History Narrative    Not on file     Social Drivers of Health     Financial Resource Strain: Low Risk  (4/22/2024)    Overall

## 2025-05-13 NOTE — DISCHARGE INSTRUCTIONS

## 2025-05-13 NOTE — OP NOTE
PROCEDURE PERFORMED: Left Lumbar Medial Branch Radiofrequency Ablation using Fluoroscopy    PREOPERATIVE DIAGNOSIS: Lumbosacral spondylosis    INDICATIONS: Chronic low back pain    The patient's history and physical exam were reviewed.  The risk, benefits, and alternatives of the procedure were discussed and all questions were answered to the patient's satisfaction.  The patient agreed to proceed and written informed consent was obtained.    POSTOPERATIVE DIAGNOSIS: Lumbosacral spondylosis    PHYSICIAN:  Dr. Man Hdz DO    ANESTHESIA:  LOCAL    ASSISTANT:  NONE    PATHOLOGY:  NONE    ESTIMATED BLOOD LOSS:  N/A    IMPLANTS:  NONE    PROCEDURE DESCRIPTION: Left lumbar medial branch radiofrequency ablation using fluoroscopy    The patient was placed on the operative bed in prone position.  The area was prepped with  Chlorhexidine.  The area was then draped in a sterile fashion.    Targeted levels: Left lumbar L3, L4, L5 medial branch radiofrequency ablation    An AP  fluoroscopy image was used to identify and gildardo Luo's point at the L3, L4 levels on the targeted side.  Additionally, the junction of the SAP and sacral ala was also marked on the same side.  The skin and subcutaneous tissues were then anesthetized with 1% lidocaine. At each lumbar medial branch, a 20-gauge, 100 mm curved with 10 mm active tip probe was advanced under AP fluoroscopic projections until bone was contacted along the medial aspect of the transverse process.  Aspiration of each needle was negative for blood, CSF and paresthesia prior to injection.   Motor stimulation at 2 Hz and 1.2 V was negative.  Then, after negative aspiration, 1 mL lidocaine 2% was injected at each level prior to lesioning which was performed at 80°C for 90 seconds.  Once the lesion was complete, injectate solution containing Kenalog 40 mg and 2 mL lidocaine 1% was injected at each site with a total of 1 mL.  The probes were then removed.  The needle sites

## 2025-07-14 ENCOUNTER — HOSPITAL ENCOUNTER (OUTPATIENT)
Age: 55
Setting detail: SPECIMEN
Discharge: HOME | End: 2025-07-14

## 2025-07-14 DIAGNOSIS — E55.9 VITAMIN D DEFICIENCY: ICD-10-CM

## 2025-07-14 DIAGNOSIS — E66.813 CLASS 3 SEVERE OBESITY DUE TO EXCESS CALORIES WITH SERIOUS COMORBIDITY AND BODY MASS INDEX (BMI) OF 45.0 TO 49.9 IN ADULT (HCC): ICD-10-CM

## 2025-07-14 DIAGNOSIS — E78.2 MIXED HYPERLIPIDEMIA: ICD-10-CM

## 2025-07-14 DIAGNOSIS — R73.03 PREDIABETES: ICD-10-CM

## 2025-07-14 DIAGNOSIS — I10 ESSENTIAL HYPERTENSION: ICD-10-CM

## 2025-07-14 DIAGNOSIS — F41.9 ANXIETY: ICD-10-CM

## 2025-07-14 LAB
25(OH)D3 SERPL-MCNC: 28.1 NG/ML (ref 30–100)
ALBUMIN SERPL-MCNC: 3.7 G/DL (ref 3.5–5.2)
ALBUMIN/GLOB SERPL: 1.2 {RATIO} (ref 1–2.5)
ALP SERPL-CCNC: 114 U/L (ref 35–104)
ALT SERPL-CCNC: 19 U/L (ref 10–35)
ANION GAP SERPL CALCULATED.3IONS-SCNC: 11 MMOL/L (ref 9–16)
AST SERPL-CCNC: 19 U/L (ref 10–35)
BASOPHILS # BLD: 0.06 K/UL (ref 0–0.2)
BASOPHILS NFR BLD: 1 % (ref 0–2)
BILIRUB SERPL-MCNC: 0.6 MG/DL (ref 0–1.2)
BUN SERPL-MCNC: 15 MG/DL (ref 6–20)
CALCIUM SERPL-MCNC: 9.1 MG/DL (ref 8.6–10.4)
CHLORIDE SERPL-SCNC: 103 MMOL/L (ref 98–107)
CHOLEST SERPL-MCNC: 174 MG/DL (ref 0–199)
CHOLESTEROL/HDL RATIO: 3.7
CO2 SERPL-SCNC: 25 MMOL/L (ref 20–31)
CREAT SERPL-MCNC: 0.7 MG/DL (ref 0.6–0.9)
EOSINOPHIL # BLD: 0.26 K/UL (ref 0–0.44)
EOSINOPHILS RELATIVE PERCENT: 3 % (ref 1–4)
ERYTHROCYTE [DISTWIDTH] IN BLOOD BY AUTOMATED COUNT: 18.5 % (ref 11.8–14.4)
GFR, ESTIMATED: >90 ML/MIN/1.73M2
GLUCOSE SERPL-MCNC: 84 MG/DL (ref 74–99)
HCT VFR BLD AUTO: 42.7 % (ref 36.3–47.1)
HDLC SERPL-MCNC: 47 MG/DL
HGB BLD-MCNC: 12.8 G/DL (ref 11.9–15.1)
IMM GRANULOCYTES # BLD AUTO: 0.06 K/UL (ref 0–0.3)
IMM GRANULOCYTES NFR BLD: 1 %
LDLC SERPL CALC-MCNC: 105 MG/DL (ref 0–100)
LYMPHOCYTES NFR BLD: 2.45 K/UL (ref 1.1–3.7)
LYMPHOCYTES RELATIVE PERCENT: 26 % (ref 24–43)
MCH RBC QN AUTO: 25 PG (ref 25.2–33.5)
MCHC RBC AUTO-ENTMCNC: 30 G/DL (ref 28.4–34.8)
MCV RBC AUTO: 83.4 FL (ref 82.6–102.9)
MONOCYTES NFR BLD: 0.48 K/UL (ref 0.1–1.2)
MONOCYTES NFR BLD: 5 % (ref 3–12)
NEUTROPHILS NFR BLD: 64 % (ref 36–65)
NEUTS SEG NFR BLD: 6.06 K/UL (ref 1.5–8.1)
NRBC BLD-RTO: 0 PER 100 WBC
PLATELET # BLD AUTO: 367 K/UL (ref 138–453)
PMV BLD AUTO: 10 FL (ref 8.1–13.5)
POTASSIUM SERPL-SCNC: 4.6 MMOL/L (ref 3.7–5.3)
PROT SERPL-MCNC: 6.9 G/DL (ref 6.6–8.7)
RBC # BLD AUTO: 5.12 M/UL (ref 3.95–5.11)
RBC # BLD: ABNORMAL 10*6/UL
SODIUM SERPL-SCNC: 139 MMOL/L (ref 136–145)
TRIGL SERPL-MCNC: 109 MG/DL
TSH SERPL DL<=0.05 MIU/L-ACNC: 1.22 UIU/ML (ref 0.27–4.2)
VLDLC SERPL CALC-MCNC: 22 MG/DL (ref 1–30)
WBC OTHER # BLD: 9.4 K/UL (ref 3.5–11.3)

## (undated) DEVICE — DEFENDO AIR WATER SUCTION AND BIOPSY VALVE KIT FOR  OLYMPUS: Brand: DEFENDO AIR/WATER/SUCTION AND BIOPSY VALVE

## (undated) DEVICE — JELLY,LUBE,STERILE,FLIP TOP,TUBE,2-OZ: Brand: MEDLINE

## (undated) DEVICE — GOWN,POLY REINFORCED,LG: Brand: MEDLINE

## (undated) DEVICE — GLOVE ORANGE PI 7   MSG9070